# Patient Record
Sex: FEMALE | Race: WHITE | NOT HISPANIC OR LATINO | URBAN - METROPOLITAN AREA
[De-identification: names, ages, dates, MRNs, and addresses within clinical notes are randomized per-mention and may not be internally consistent; named-entity substitution may affect disease eponyms.]

---
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
## 2021-07-20 ENCOUNTER — OFFICE VISIT - RIVER FALLS (OUTPATIENT)
Dept: FAMILY MEDICINE | Facility: CLINIC | Age: 21
End: 2021-07-20

## 2021-07-20 ASSESSMENT — MIFFLIN-ST. JEOR: SCORE: 1478.49

## 2021-07-21 ENCOUNTER — COMMUNICATION - RIVER FALLS (OUTPATIENT)
Dept: FAMILY MEDICINE | Facility: CLINIC | Age: 21
End: 2021-07-21

## 2021-07-21 LAB
CHLAMYDIA TRACHOMATIS RNA, TMA - QUEST: NOT DETECTED
HIV AG/AB  - NOTE: NORMAL
NEISSERIA GONORRHOEAE RNA TMA: NOT DETECTED
RPR: NORMAL

## 2022-02-11 VITALS
WEIGHT: 102.2 LBS | SYSTOLIC BLOOD PRESSURE: 124 MMHG | HEART RATE: 83 BPM | OXYGEN SATURATION: 98 % | BODY MASS INDEX: 16.62 KG/M2 | DIASTOLIC BLOOD PRESSURE: 76 MMHG | TEMPERATURE: 96.6 F

## 2022-02-11 VITALS
TEMPERATURE: 97.7 F | SYSTOLIC BLOOD PRESSURE: 128 MMHG | HEART RATE: 80 BPM | OXYGEN SATURATION: 98 % | WEIGHT: 180.4 LBS | SYSTOLIC BLOOD PRESSURE: 116 MMHG | BODY MASS INDEX: 28.99 KG/M2 | TEMPERATURE: 97.9 F | HEIGHT: 66 IN | BODY MASS INDEX: 29.18 KG/M2 | DIASTOLIC BLOOD PRESSURE: 64 MMHG | DIASTOLIC BLOOD PRESSURE: 68 MMHG | WEIGHT: 179.4 LBS | HEART RATE: 106 BPM

## 2022-02-11 VITALS
WEIGHT: 175 LBS | SYSTOLIC BLOOD PRESSURE: 118 MMHG | HEART RATE: 68 BPM | TEMPERATURE: 98 F | BODY MASS INDEX: 28.46 KG/M2 | DIASTOLIC BLOOD PRESSURE: 74 MMHG

## 2022-02-11 VITALS
HEART RATE: 60 BPM | DIASTOLIC BLOOD PRESSURE: 72 MMHG | TEMPERATURE: 97.8 F | WEIGHT: 177 LBS | SYSTOLIC BLOOD PRESSURE: 124 MMHG | BODY MASS INDEX: 28.45 KG/M2 | HEIGHT: 66 IN | RESPIRATION RATE: 16 BRPM | OXYGEN SATURATION: 97 %

## 2022-02-11 VITALS
HEART RATE: 67 BPM | SYSTOLIC BLOOD PRESSURE: 124 MMHG | HEIGHT: 66 IN | OXYGEN SATURATION: 97 % | WEIGHT: 168.2 LBS | WEIGHT: 172 LBS | HEART RATE: 70 BPM | DIASTOLIC BLOOD PRESSURE: 80 MMHG | HEIGHT: 66 IN | SYSTOLIC BLOOD PRESSURE: 118 MMHG | TEMPERATURE: 97.4 F | BODY MASS INDEX: 27.03 KG/M2 | DIASTOLIC BLOOD PRESSURE: 62 MMHG | TEMPERATURE: 98 F | BODY MASS INDEX: 27.64 KG/M2

## 2022-02-11 VITALS — DIASTOLIC BLOOD PRESSURE: 78 MMHG | HEART RATE: 80 BPM | WEIGHT: 179 LBS | SYSTOLIC BLOOD PRESSURE: 118 MMHG

## 2022-02-11 VITALS
SYSTOLIC BLOOD PRESSURE: 136 MMHG | HEIGHT: 65 IN | BODY MASS INDEX: 25.99 KG/M2 | TEMPERATURE: 98.3 F | DIASTOLIC BLOOD PRESSURE: 82 MMHG | WEIGHT: 156 LBS | HEART RATE: 71 BPM

## 2022-02-11 VITALS
OXYGEN SATURATION: 98 % | BODY MASS INDEX: 29.86 KG/M2 | HEART RATE: 90 BPM | TEMPERATURE: 99.2 F | DIASTOLIC BLOOD PRESSURE: 80 MMHG | WEIGHT: 183.6 LBS | SYSTOLIC BLOOD PRESSURE: 118 MMHG

## 2022-02-11 VITALS
WEIGHT: 175.6 LBS | BODY MASS INDEX: 28.22 KG/M2 | DIASTOLIC BLOOD PRESSURE: 68 MMHG | HEIGHT: 66 IN | HEART RATE: 72 BPM | SYSTOLIC BLOOD PRESSURE: 114 MMHG

## 2022-02-11 VITALS
DIASTOLIC BLOOD PRESSURE: 70 MMHG | SYSTOLIC BLOOD PRESSURE: 117 MMHG | HEART RATE: 68 BPM | HEIGHT: 66 IN | WEIGHT: 147 LBS | TEMPERATURE: 97.6 F | DIASTOLIC BLOOD PRESSURE: 68 MMHG | SYSTOLIC BLOOD PRESSURE: 112 MMHG | WEIGHT: 150 LBS | BODY MASS INDEX: 24.11 KG/M2 | HEART RATE: 70 BPM

## 2022-02-16 NOTE — NURSING NOTE
Comprehensive Intake Entered On:  7/8/2019 5:50 PM CDT    Performed On:  7/8/2019 5:45 PM CDT by Shavonne Cobos LPN               Summary   Chief Complaint :   burning while urination started about two weeks ago. has increased fluid. Is also concern with STD's   Menstrual Status :   Menarcheal   Weight Measured :   172 lb(Converted to: 172 lb 0 oz, 78.02 kg)    Height Measured :   65.75 in(Converted to: 5 ft 6 in, 167.00 cm)    Body Mass Index :   27.97 kg/m2   Body Surface Area :   1.9 m2   Systolic Blood Pressure :   118 mmHg   Diastolic Blood Pressure :   62 mmHg   Mean Arterial Pressure :   81 mmHg   Peripheral Pulse Rate :   70 bpm   BP Site :   Right arm   Pulse Site :   Radial artery   BP Method :   Manual   HR Method :   Manual   Temperature Tympanic :   98.0 DegF(Converted to: 36.7 DegC)    Languages :   English   Shavonne Cobos LPN - 7/8/2019 5:45 PM CDT   Health Status   Allergies Verified? :   Yes   Medication History Verified? :   Yes   Shavonne Cobos LPN - 7/8/2019 5:45 PM CDT   Consents   Consent for Immunization Exchange :   Consent Granted   Consent for Immunizations to Providers :   Consent Granted   Shavonne Cobos LPN - 7/8/2019 5:45 PM CDT   Problems   (As Of: 7/8/2019 5:50:37 PM CDT)   Problems(Active)    Major depressive disorder (SNOMED CT  :6329751403 )  Name of Problem:   Major depressive disorder ; Recorder:   Diana Glasgow; Confirmation:   Confirmed ; Classification:   Medical ; Code:   5580694102 ; Contributor System:   De Correspondent ; Last Updated:   5/26/2017 12:12 PM CDT ; Life Cycle Date:   5/26/2017 ; Life Cycle Status:   Active ; Vocabulary:   SNOMED CT          Meds / Allergies   (As Of: 7/8/2019 5:50:38 PM CDT)   Allergies (Active)   No Known Medication Allergies  Estimated Onset Date:   Unspecified ; Created By:   Tamra Booker CMA; Reaction Status:   Active ; Category:   Drug ; Substance:   No Known Medication Allergies ; Type:   Allergy ; Updated By:   Jhony ZHOU  Tamra; Reviewed Date:   7/8/2019 5:49 PM CDT        Medication List   (As Of: 7/8/2019 5:50:38 PM CDT)   Prescription/Discharge Order    fluconazole  :   fluconazole ; Status:   Processing ; Ordered As Mnemonic:   Diflucan 150 mg oral tablet ; Ordering Provider:   Freddy Acuña MD; Action Display:   Complete ; Catalog Code:   fluconazole ; Order Dt/Tm:   7/8/2019 5:46:07 PM

## 2022-02-16 NOTE — NURSING NOTE
Comprehensive Intake Entered On:  8/14/2019 8:04 AM CDT    Performed On:  8/14/2019 7:55 AM CDT by Debbie Brenner               Summary   Chief Complaint :   c/o  irregular periods- bad cramps and heavy bleeding , migranes x 1 1/2 weeks    Menstrual Status :   Menarcheal   Weight Measured :   168.2 lb(Converted to: 168 lb 3 oz, 76.29 kg)    Height Measured :   65.75 in(Converted to: 5 ft 6 in, 167.00 cm)    Body Mass Index :   27.35 kg/m2   Body Surface Area :   1.88 m2   Systolic Blood Pressure :   124 mmHg   Diastolic Blood Pressure :   80 mmHg   Mean Arterial Pressure :   95 mmHg   Peripheral Pulse Rate :   67 bpm   BP Site :   Right arm   BP Method :   Manual   HR Method :   Electronic   Temperature Tympanic :   97.4 DegF(Converted to: 36.3 DegC)  (LOW)    Oxygen Saturation :   97 %   Languages :   English   Debbie Brenner - 8/14/2019 7:55 AM CDT   Health Status   Allergies Verified? :   Yes   Medication History Verified? :   Yes   Medical History Verified? :   Yes   Pre-Visit Planning Status :   Completed   Tobacco Use? :   Never smoker   Debbie Brenner - 8/14/2019 7:55 AM CDT   Consents   Consent for Immunization Exchange :   Consent Granted   Consent for Immunizations to Providers :   Consent Granted   Debbie Brenner - 8/14/2019 7:55 AM CDT   Meds / Allergies   (As Of: 8/14/2019 8:04:44 AM CDT)   Allergies (Active)   No Known Medication Allergies  Estimated Onset Date:   Unspecified ; Created By:   Tamra Booker CMA; Reaction Status:   Active ; Category:   Drug ; Substance:   No Known Medication Allergies ; Type:   Allergy ; Updated By:   Tamra Booker CMA; Reviewed Date:   8/14/2019 8:02 AM CDT        Medication List   (As Of: 8/14/2019 8:04:44 AM CDT)   No Known Home Medications     Debbie Brenner - 8/14/2019 8:02:15 AM

## 2022-02-16 NOTE — NURSING NOTE
Comprehensive Intake Entered On:  2/5/2020 8:00 AM CST    Performed On:  2/5/2020 8:00 AM CST by Elisa Brooks CMA               Summary   Chief Complaint :   u/s per [facility] for pelvic pain.   Menstrual Status :   Menarcheal   Languages :   English   Elisa Brooks CMA - 2/5/2020 8:00 AM CST   Health Status   Allergies Verified? :   Yes   Medication History Verified? :   Yes   Pre-Visit Planning Status :   Completed   Elisa Brooks CMA - 2/5/2020 8:00 AM CST   Meds / Allergies   (As Of: 2/5/2020 8:00:52 AM CST)   Allergies (Active)   No Known Medication Allergies  Estimated Onset Date:   Unspecified ; Created By:   Tamra Booker CMA; Reaction Status:   Active ; Category:   Drug ; Substance:   No Known Medication Allergies ; Type:   Allergy ; Updated By:   Tamra Booker CMA; Reviewed Date:   8/14/2019 8:02 AM CDT        Medication List   (As Of: 2/5/2020 8:00:52 AM CST)   Prescription/Discharge Order    ethinyl estradiol-etonogestrel  :   ethinyl estradiol-etonogestrel ; Status:   Prescribed ; Ordered As Mnemonic:   NuvaRing 0.120 mg-0.015 mg/24 hours vaginal ring ; Simple Display Line:   1 EA, VAG, q4 wks, as directed--dispense 1 box of 3, 1 box(es), 0 Refill(s) ; Ordering Provider:   Jeanette Conroy; Catalog Code:   ethinyl estradiol-etonogestrel ; Order Dt/Tm:   8/14/2019 11:34:21 AM CDT          FLUoxetine  :   FLUoxetine ; Status:   Prescribed ; Ordered As Mnemonic:   PROzac 20 mg oral capsule ; Simple Display Line:   20 mg, 1 cap(s), Oral, daily, for the first week take 1/2 tab daily, 30 cap(s), 0 Refill(s) ; Ordering Provider:   Geovanna Green MD; Catalog Code:   FLUoxetine ; Order Dt/Tm:   1/20/2020 5:09:35 PM CST

## 2022-02-16 NOTE — LETTER
(Inserted Image. Unable to display)     January 07, 2019      YASSINE ARELLANO  [address], [place] [id number]          Dear YASSINE,      Thank you for selecting [facility] (previously [facility]) for your healthcare needs.      Our records indicate you are due for the following services:     Depo Provera injection due between 01/14 and 01/28/2019.      To schedule an appointment or if you have further questions, please contact your primary clinic:   [facility]       [phone]   [facility]       [phone]              [facility]     [phone]      Powered by [email or web address] and [employer]    Sincerely,    Vivi Aparicio MD

## 2022-02-16 NOTE — LETTER
(Inserted Image. Unable to display)   July 10, 2019        YASSINE ARELLANO      [address] [place] [id number]        Dear YASSINE,    Thank you for selecting [facility] for your health care needs.  Below you will find the results of your recent test(s) done at our clinic.     The tests were normal.      Result Name Current Result Previous Result Reference Range   Chlam/N. gonorrhea Comments  See comment 7/8/2019  See comment 5/23/2019    Chlamydia RNA  NOT DETECTED 7/8/2019  NOT DETECTED 5/23/2019 NOT DETECTED -    Neisseria gonorrhoeae RNA  NOT DETECTED 7/8/2019  NOT DETECTED 5/23/2019 NOT DETECTED -        Please contact me or my assistant at [phone] if you have any questions about your results.    Sincerely,        Doc Samuel MD        What do your labs mean?  Below is a glossary of commonly ordered labs:  LDL   Bad Cholesterol   HDL   Good Cholesterol  AST/ALT   Liver Function   Cr/Creatinine   Kidney Function  Microalbumin   Kidney Function  BUN   Kidney Function  PSA   Prostate    TSH   Thyroid Hormone  HgbA1c   Diabetes Test   Hgb (Hemoglobin)   Red Blood Cells

## 2022-02-16 NOTE — PROGRESS NOTES
Patient:   YASSINE ARELLANO            MRN: [id number]            FIN: [id number]               Age:   19 years     Sex:  Female     :  2000   Associated Diagnoses:   Irregular menstrual bleeding   Author:   Bob Tim MD      Visit Information      Date of Service: 2020 07:59 am  Performing Location: [facility]  Encounter#: [id number]      Primary Care Provider (PCP):  Jeanette Conroy    NPI# [id number]      Chief Complaint   2020 8:00 AM CST     u/s per [facility] for pelvic pain.      Interval History   The patient is an 19-year-old nulligravid female referred by Dr. Geovanna Green for followup of irregular menses.  The patient was a rather poor historian and really did not have much insight into her history of irregular periods and pelvic pain for which she was referred.  After multiple questions the patient thinks that she probably had irregular periods when she started having menses and was given Depo-Provera to control bleeding for a couple of years.  She was told to go off Depo after a few years and did not use anything for birth control and was sexually active.  On Depo she had some irregular periods again and then when stopping Depo she began having a bit more irregularity and prolonged bleeding episodes.  She saw Dr. Green about a month ago she states and was screened for STDs.  She was started on the NuvaRing and referred for ultrasound with me.  Since being on the NuvaRing the patient believes that her periods have been fairly well-controlled and she is no longer experiencing any discomfort.  She has probably had sex once in the last month and she says she does not recall having pain or not.  She has not had any bleeding recently.        Review of Systems   Review  of systems is negative except as documented under interval history.      Health Status   Allergies:    Allergic Reactions (Selected)  No Known Medication Allergies   Medications:  (Selected)    Prescriptions  Prescribed  NuvaRing 0.120 mg-0.015 mg/24 hours vaginal rin EA, VAG, q4 wks, Instructions: as directed--dispense 1 box of 3, # 1 box(es), 0 Refill(s), Type: Maintenance, Pharmacy: [id number], 1 EA VAG q4 wks,Instr:as directed--dispense 1 box of 3  PROzac 20 mg oral capsule: = 1 cap(s) ( 20 mg ), Oral, daily, Instructions: for the first week take 1/2 tab daily, # 30 cap(s), 0 Refill(s), Type: Maintenance, Pharmacy: [facility] #[id number], 1 cap(s) Oral daily,Instr:for the first week take 1/2 tab daily   Problem list:    All Problems  Major depressive disorder / SNOMED CT 6839760218 / Confirmed  Resolved: Inpatient stay / SNOMED CT 186900363      Histories   Past Medical History:    Active  Major depressive disorder (6076035460)  Resolved  Inpatient stay (325475513): Onset on 2017 at 17 years.  Resolved on 2017 at 17 years.  Comments:  2017 CDT 10:16 AM CDT - Vy Bethea  @[facility], MMD, moderate   Family History:    Alcohol abuse  Father (Lennox Sin)  Grandparent     Procedure history:    Closed reduction of dislocation (88446404) on 2009 at 8 Years.  Comments:  2013 1:00 PM CDT - Chante Davidson  Left radius fracture   Social History:        Alcohol Assessment            Never      Home and Environment Assessment            Lives with Mother, Stepfather, 1 younger brother and 1 younger sister..        Physical Examination   Gynecologic:  Vaginal probe ultrasound was performed..       Review / Management   Radiology results   Ultrasound, Vaginal probe pelvic ultrasound was performed.  The uterus is anteverted and normal in size.  It measures 6 x 2.5 x 3 cm.  Endometrium is really proliferative in appearance.  The myometrium is normal with no abnormalities seen.  There is no tenderness with movement of the uterus using the vaginal probe.  The ovaries are small and normal bilaterally with no fluid in the cul-de-sac and no pelvic tenderness  on examination.      Impression and Plan   Diagnosis     Irregular menstrual bleeding ([id number] N92.6).     Apparently anovulatory bleeding when not on menstrual suppression now pain and bleeding controlled with NuvaRing..     Plan:  The patient was advised that she probably should plan to use the pill or NuvaRing to control bleeding and also for birth control as well.  She seemed comfortable with this and will return as needed..    Patient Instructions:       Counseled: Patient, Regarding diagnosis, Regarding treatment, Regarding medications, Verbalized understanding.

## 2022-02-16 NOTE — PROGRESS NOTES
Chief Complaint    pt c/o cough with yellow/green phlegm, headache, runny nose, chest congestion, fever, for the past couple of days, right ear pain  History of Present Illness      Has had symptoms for about 4-5 days. Had sore throat and now resolved. Began with a cough. Had sinus pressure which is better and runny nose with post nasal drip. Feeling a little better today. Missed school today.  Review of Systems      No fevers       No leg swelling       No rashes  Physical Exam   Vitals & Measurements    T: 99.2(Tympanic)  HR: 104(Peripheral)  BP: 118/80  SpO2: 98%     WT: 183.6 lb       General: No acute distress. Sniffling during exam      HENT: Tympanic membranes are clear, No pharyngeal erythema.      Neck: No lymphadenopathy.      Respiratory: Lungs are clear to auscultation.      Cardiovascular: Normal rate, Regular rhythm.      Musculoskeletal: Normal gait.  Assessment/Plan   Likely viral syndrome: Discussed symptomatic treatment and will hold off on antibiotics at this time.  Follow-up if not improving.  Patient Information     Name:YASSINE ARELLANO      Address:      [address] [address]     Sex:Female     YOB: 2000     [phone]     Emergency Contact:BERNIE POLLARD     MRN:[id number]     FIN:[id number]     Location:[facility]     Date of Service:01/29/2018      Primary Care Physician:       Jeanine Carreon, [phone]  Procedure/Surgical History     Closed reduction of dislocation (01/29/2009)  Medications        Depo-Provera Contraceptive 150 mg/mL intramuscular suspension: 150 mg, im, once, Order expires 1.30.2018, 1 mL, 0 Refill(s).                Allergies    No known allergies

## 2022-02-16 NOTE — PROGRESS NOTES
Chief Complaint    c/o left great toenail is ingrown--toe swollen, redness. denies drainage.  History of Present Illness      Patient is here for an ingrown toenail for the past couple of weeks.  She has been applying alcohol to her toenail.  Area is red, swollen, tender.  Review of Systems           See HPI.  All other review of systems negative.              Physical Exam   Vitals & Measurements    T: 97.9   F (Tympanic)  HR: 80(Peripheral)  BP: 116/64     HT: 65.75 in  WT: 180.4 lb  BMI: 29.34           General:  Alert and oriented, No acute distress.            Eye:  Pupils are equal, round and reactive to light, Normal conjunctiva.              Musculoskeletal:  right great toenail swollen, red, tender.          Integumentary:  Warm, No rash.            Psychiatric:  Cooperative, Appropriate mood & affect, Normal judgment.             Assessment/Plan       1. Ingrown left big toenail (L60.0)         Informed consent was obtained.  A tourniquet was placed on the toes and digital block done with 3cc of 1% lidocaine.  The lateral 5mm of the nail were elevated with the blunt edge of a scissors and cut down to the matrix.  The piece of nail was removed with a straight hemostat.  The tourniquet was removed and hemostasis was achieved with pressure.  A pressure dressing was applied.  The procedure was tolerated well.                        IChante MA, acted solely as a scribe for, and in the presence of Dr. Juan Antonio Samuel who performed the services.             IJuan Antonio MD, personally performed the services described in this documentation.  The documentation was scribed in my presence and is both accurate and complete.                Patient Information     Name:YASSINE ARELLANO      Address:      [address] [place] [id number]     Sex:Female     YOB: 2000     [phone]     Emergency Contact:BERNIE POLLARD     MRN:[id number]     FIN:[id number]      Location:[facility]     Date of Service:09/24/2018      Primary Care Physician:       Jeanine Carreon, [phone]      Attending Physician:       Juan Antonio Samuel MD, [phone]  Problem List/Past Medical History    Ongoing     Major depressive disorder    Historical     Inpatient stay       Comments: @[place] - JAM, MMD, moderate  Procedure/Surgical History     Closed reduction of dislocation (01/29/2009)            Comments:      Left radius fracture  Medications     No Recorded Medications      Allergies    No known allergies  Social History    Smoking Status - 09/24/2018     Never smoker     Home and Environment      Lives with Mother, Stepfather, 1 younger brother and 1 younger sister.., 12/14/2016     Tobacco - Denies Tobacco Use, 05/26/2017  Family History    Alcohol abuse: Father and Grandparent.  Immunizations      Vaccine Date Status Comments      influenza (LAIV) 10/15/2015 Given      influenza (LAIV) 10/20/2014 Given      human papillomavirus vaccine 03/12/2014 Given [3/12/2014] Back      Hep A, pediatric/adolescent 03/12/2014 Given [3/12/2014] Front      human papillomavirus vaccine 11/06/2013 Given      human papillomavirus vaccine 08/16/2013 Given      Hep A, pediatric/adolescent 08/16/2013 Given [8/16/2013] L upper      meningococcal conjugate vaccine 08/16/2013 Given [8/16/2013] L lower      varicella 08/02/2012 Given      tetanus/diphth/pertuss (Tdap) adult/adol 08/02/2012 Given      influenza, H1N1, inactivated 12/15/2009 Recorded      MMR (measles/mumps/rubella) 03/31/2006 Recorded      OPV 03/31/2006 Recorded      DTaP 03/31/2006 Recorded      MMR (measles/mumps/rubella) 09/24/2002 Recorded      DTaP 09/24/2002 Recorded      Hib (PRP-T) 04/22/2002 Recorded      Hep B 04/22/2002 Recorded      varicella 04/22/2002 Recorded      pneumococcal (PCV7) 2001 Recorded      OPV 2001 Recorded      DTaP 2001 Recorded      Hib (PRP-T) 2001 Recorded       Hep B 2001 Recorded      pneumococcal (PCV7) 2001 Recorded      OPV 2001 Recorded      DTaP 2001 Recorded      Hib (PRP-T) 2001 Recorded      Hep B 2001 Recorded      pneumococcal (PCV7) 2001 Recorded      OPV 2001 Recorded      DTaP 2001 Recorded      pneumococcal (PCV7) 2001 Recorded      rotavirus vaccine - Not Given      rotavirus vaccine - Not Given      rotavirus vaccine - Not Given      IPV - Not Given      IPV - Not Given      IPV - Not Given      IPV - Not Given      Hib (HbOC) - Not Given

## 2022-02-16 NOTE — PROGRESS NOTES
Patient:   YASSINE SIN            MRN: [id number]            FIN: [id number]               Age:   21 years     Sex:  Female     :  2000   Associated Diagnoses:   Vagina itching; Screen for STD (sexually transmitted disease)   Author:   Sid Smith MD      Visit Information      Date of Service: 2021 03:55 pm  Performing Location: [facility]  Encounter#: [id number]      Primary Care Provider (PCP):  Jeanette Conroy    NPI# [id number]      Referring Provider:  Sid Smith MD    NPI# [id number]      Chief Complaint   2021 4:03 PM CDT    vaginal yeast infection, OTC medication has improved, continues to have discharge.      History of Present Illness   Patient is here because of vaginal discharge she treated herself with the thought was a yeast infection a few weeks ago she is Monistat.  She still has some scant clearish discharge and has had prior vaginosis wants to be checked out.  Also wants STD screening she had a boyfriend for several years and they recently split up. Denies being pregnant            Review of Systems   Constitutional:  Negative except as documented in history of present illness.    Genitourinary:  Negative.    Gynecologic:  Negative except as documented in history of present illness.       Health Status   Allergies:    Allergic Reactions (Selected)  No Known Medication Allergies   Problem list:    All Problems  Major depressive disorder / SNOMED CT 5083263859 / Confirmed      Histories   Past Medical History:    Active  Major depressive disorder (6478470479)  Resolved  Inpatient stay (131155671): Onset on 2017 at 17 years.  Resolved on 2017 at 17 years.  Comments:  2017 CDT 10:16 AM CDT - Vy Bethea  @[place] - [facility], MMD, moderate   Family History:    Alcohol abuse  Father (Lennox Sin)  Grandparent     Procedure history:    Closed reduction of dislocation (SNOMED CT 13186577) performed by Wili Bentley MD on  1/29/2009 at 8 Years.  Comments:  8/7/2013 1:00 PM CDT - Chante Davidson  Left radius fracture   Social History:        Electronic Cigarette/Vaping Assessment            Electronic Cigarette Use: Never.      Alcohol Assessment            Never      Tobacco Assessment            Never (less than 100 in lifetime)      Home and Environment Assessment            Lives with Mother, Stepfather, 1 younger brother and 1 younger sister..        Physical Examination   Measurements from flowsheet : Measurements   7/20/2021 4:03 PM CDT Height Measured - Standard 65 in    Height/Length Estimated 65.75 in    Weight Measured - Standard 156 lb    BSA 1.8 m2    Body Mass Index 25.96 kg/m2  HI      General:  Alert and oriented, No acute distress.    Genitourinary:  She had normal external genitalia no significant discharge noted swab taken for wet prep.  No external lesions noted  .    Neurologic:  Alert, Oriented.       Impression and Plan   Diagnosis     Vagina itching (XTC78-KS N89.8).     Screen for STD (sexually transmitted disease) (MIV18-ZK Z11.3).     Plan:  Patient with what is presumed to be recent yeast infection is symptoms pretty much cleared but did not completely go away wants to be screened for STDs which we will do today.  Follow-up if things are back to normal over the next month  .

## 2022-02-16 NOTE — NURSING NOTE
Received call from patient's mom, Isabel at 1412 stating that she made an appt for patient to get her next Depo Provera injection, but was told she needs a new order.      Called Isabel back and TATIANNA at 1435- last time order was initiated was 1/2017.  Advised that order is good for 1 year, and that patient is due for a follow up/check up with [facility] at this time.  Advised to call back to schedule appt, or if she has any further questions.

## 2022-02-16 NOTE — LETTER
(Inserted Image. Unable to display)     November 15, 2019      YASSINE ARELLANO  [address]   [place], [place] [id number]          Dear YASSINE,      Thank you for selecting [facility] ([facility]viously [facility]) for your healthcare needs.      Our records indicate you are due for the following services:     Follow-up office visit.      To schedule an appointment or if you have further questions, please contact your primary clinic:   [facility]       [phone]   [facility]       [phone]              [facility]     [phone]      Powered by [employer]    Sincerely,    DARYL CedenoNP

## 2022-02-16 NOTE — PROGRESS NOTES
Chief Complaint    Verbal consent from mom to see today, Physical and depo shot.  History of Present Illness      Pt here today for annual exam      She denies possibility of pregnancy.  Is aware of various alternatives for contraception including nexplanon, OCP, nuvaring, patch, Depo Provera, IUD and IUS, NFP, diaphragm, condoms, abstinence and sterilization. She is aware of risks associated with estrogen including stroke, DVT, PE, CVA, MI.  Also counseled patient that all patients of reproductive age should be taking 400 mcg folic acid daily to reduce risks of 2 birth defects.              She has used depo in the past and would like to resume this, feels it has been well tolerated.  Was living on her own for a while but now back at home, her mom and she have a difficult relationship.   she is allowed to leave school early to work daily starting at 10:30, saving up money to afford her own place.  plans to go to [employer] next year.  has some insomnia but says mood is doing ok, does not want to start any meds, so is handling the insomnia and declines referral for CBT for insomnia.  says she did sleep better when she lived on her own.         Review of systems is negative except as per HPI including no fevers, chills, sore throat, runny nose, nausea, vomiting, constipation, diarrhea, rash or new skin lesions, chest pain, palpitations, slurred speech, new paresthesia, shortness of breath or wheeze.             Exam:      see vitals listed below      General: alert and oriented ×3 no acute distress.      HEENT: Normocephalic and atraumatic.       Eyes pupils are equal round and reactive to light extraocular motion is intact. normal conjunctiva      Hearing is grossly normal and there is no otorrhea. Tympanic membranes are pearly grey with a normal light reflex.      Nares are patent there is no rhinorrhea.       Mucous membranes are moist and pink.      Chest: has bilateral rise with no increased work of breathing. clear  to auscultation without wheezes, rhonchi, or rales.      Cardiovascular: normal perfusion and brisk capillary refill. S1S2 with regular rate and rhythm and no murmurs, gallops or rubs.      Musculoskeletal: no gross focal abnormalities and normal gait.      Neuro: no gross focal abnormalities and memory seems intact.  CN 2-12 are grossly intact.      Psychiatric: speech is clear and coherent and fluent. Patient dressed appropriately for the weather. Mood is appropriate and affect is full.      Abd: no rebound or guarding, normal BS      Skin: no rash or lesionsidentified      Discussed:      using sunscreen, protecting from sunburn,      taking folic acid 400 mcg daily      refer to Overinteractive Mediachooseplate.gov, AHA and ADA for diet and exercise recommendations      consume [id number] mg calcium daily      std screening      regular self skin checks      get 150min /week of aerobic exercise  Physical Exam   Vitals & Measurements    T: 97.7   F (Tympanic)  HR: 106(Peripheral)  BP: 128/68  SpO2: 98%     HT: 65.75 in  WT: 179.4 lb   Assessment/Plan       Contraception (Z30.9), Contraception management (Z30.9)         Orders:          medroxyPROGESTERone, 150 mg, im, once, (Completed)          63121 therapeutic prophylactic/dx injection subq/im (Charge), Quantity: 1, Contraception management           medroxyprogesterone acetate inj, 1 mg (Charge), Quantity: 150, Contraception management          Miscellaneous Order (Request), depo provera 150 mg IM q 13 weeks witin 2 week dosing window          POC HCG, URINE* ([facility]), Specimen Type: Urine, Collection Date: 10/29/18 10:02:00 CDT          Return to Clinic (Request), RFV: Depo Shot, Return in 3 months          Return to Clinic (Request), RFV: needs lab only urine HCG, Return in 2 weeks                Immunization due (Z23)                Left shoulder pain (M25.512)         Orders:          Occupational Therapy Evaluation and Treatment (Request), Left shoulder pain           Physical Therapy Evaluation and Treatment (Request), Low back pain  Left shoulder pain                Low back pain (M54.5)         Orders:          Physical Therapy Evaluation and Treatment (Request), Low back pain  Left shoulder pain                Screen for STD (sexually transmitted disease) (Z11.3)         Orders:          Chlamydia/Neisseria gonorrhoeae RNA, TMA* ([facility]), Specimen Type: Urine, Collection Date: 10/29/18 10:39:00 CDT               well child exam, spoke with mom and got permission to treat patient and resume depo  Patient Information     Name:YASSINE ARELLANO      Address:      [address] [place] [id number]     Sex:Female     YOB: 2000     [phone]     Emergency Contact:BERNIE POLLARD     MRN:[id number]     FIN:[id number]     Location:[facility]     Date of Service:10/29/2018      Primary Care Physician:       Jeanine Carreon, [phone]      Attending Physician:       Vivi Aparicio MD, [phone]  Problem List/Past Medical History    Ongoing     Major depressive disorder    Historical     Inpatient stay       Comments: @[place] - JAM, MMD, moderate  Procedure/Surgical History     Closed reduction of dislocation (01/29/2009)            Comments:      Left radius fracture  Medications     No Recorded Medications      Allergies    No known allergies  Social History    Smoking Status - 10/29/2018     Never smoker     Home and Environment      Lives with Mother, Stepfather, 1 younger brother and 1 younger sister.., 12/14/2016     Tobacco - Denies Tobacco Use, 05/26/2017  Family History    Alcohol abuse: Father and Grandparent.  Immunizations      Vaccine Date Status Comments      influenza (LAIV) 10/15/2015 Given      influenza (LAIV) 10/20/2014 Given      human papillomavirus vaccine 03/12/2014 Given [3/12/2014] Back      Hep A, pediatric/adolescent 03/12/2014 Given [3/12/2014] Front      human papillomavirus  vaccine 11/06/2013 Given      human papillomavirus vaccine 08/16/2013 Given      Hep A, pediatric/adolescent 08/16/2013 Given [8/16/2013] L upper      meningococcal conjugate vaccine 08/16/2013 Given [8/16/2013] L lower      varicella 08/02/2012 Given      tetanus/diphth/pertuss (Tdap) adult/adol 08/02/2012 Given      influenza, H1N1, inactivated 12/15/2009 Recorded      MMR (measles/mumps/rubella) 03/31/2006 Recorded      OPV 03/31/2006 Recorded      DTaP 03/31/2006 Recorded      MMR (measles/mumps/rubella) 09/24/2002 Recorded      DTaP 09/24/2002 Recorded      Hib (PRP-T) 04/22/2002 Recorded      Hep B 04/22/2002 Recorded      varicella 04/22/2002 Recorded      pneumococcal (PCV7) 2001 Recorded      OPV 2001 Recorded      DTaP 2001 Recorded      Hib (PRP-T) 2001 Recorded      Hep B 2001 Recorded      pneumococcal (PCV7) 2001 Recorded      OPV 2001 Recorded      DTaP 2001 Recorded      Hib (PRP-T) 2001 Recorded      Hep B 2001 Recorded      pneumococcal (PCV7) 2001 Recorded      OPV 2001 Recorded      DTaP 2001 Recorded      pneumococcal (PCV7) 2001 Recorded      rotavirus vaccine - Not Given      rotavirus vaccine - Not Given      rotavirus vaccine - Not Given      IPV - Not Given      IPV - Not Given      IPV - Not Given      IPV - Not Given      Hib (HbOC) - Not Given  Lab Results       Lab Results (Last 4 results within 90 days)        U HCG POC: NEGATIVE (10/29/18 11:07:00 CDT)

## 2022-02-16 NOTE — TELEPHONE ENCOUNTER
---------------------  From: Tere Angeles CMA   To: Appointment Pool ([id number]_[place] - [place]);     Sent: 1/20/2020 1:53:04 PM CST  Subject: F/u appt     Patient was transferred to the  and [facility] requesting to make a follow up appt with [facility] for contraception. Please call [phone]  okLM on  to call us back and speak to JOANNE before making appointment.

## 2022-02-16 NOTE — PROGRESS NOTES
Patient:   YASSINE ARELLANO            MRN: [id number]            FIN: [id number]               Age:   16 years     Sex:  Female     :  2000   Associated Diagnoses:   Contraception   Author:   Jeanine Carreon      Health Status   Allergies:    Allergic Reactions (Selected)  No known allergies   Medications:  (Selected)   Prescriptions  Prescribed  PROzac 20 mg oral capsule: 2 cap(s) ( 40 mg ), po, daily, # 60 cap(s), 5 Refill(s), Type: Maintenance, Pharmacy: [facility] [id number], 2 cap(s) po daily  Documented Medications  Documented  Depo-Provera: ( 400 mg ), im, 0 Refill(s), Type: Maintenance     PPC with mother, she normally gets depo injection through previous clinic but would like to use our clinic instead,  she has brought paperwork from [facility] in [place]   She is due for her next depo shot at this time and has been compliant with her previous injections         Objective   General:  Alert and oriented, No acute distress.    Eye:  Pupils are equal, round and reactive to light.    HENT:  Normocephalic.    Musculoskeletal:  Normal range of motion.    Integumentary:  Warm, Dry, Pink.    Neurologic:  Alert, Oriented, Normal sensory.    Psychiatric:  Cooperative, Appropriate mood & affect, Normal judgment.       Impression and Plan   Assessment and Plan:          Diagnosis: Contraception (KKY32-ZK Z30.9), Contraception (FHI47-IO Z30.9).         Course: is not late for depo   can have injection through our clinic  prescription will stand for a yr, does not need pap smear, will need regular well child checks.

## 2022-02-16 NOTE — TELEPHONE ENCOUNTER
---------------------  From: Shavonne Kaur CMA (Phone Messages Pool ([id number]_[facility]))   To: Da Gibson MD;     Sent: 10/7/2020 5:33:17 PM CDT  Subject: General Message     Time: 5:30 pm  Note: Received patients RST, results were negative.

## 2022-02-16 NOTE — PROGRESS NOTES
Chief Complaint    c/o itching and spotting between periods  History of Present Illness      Patient would like to be screened for STDs.  She had a relationship that ended and he had chlamydia and gonorrhea.  She has been tested and was negative and she has not had contact with him or any other sexual contact since then which is wants to be retested.  She does note she had a little bit of irritation in the vagina no real discharge no bleeding no fever or chills  Review of Systems      Negative except for HPI  Physical Exam   Vitals & Measurements    HR: 72(Peripheral)  BP: 114/68     HT: 65.75 in  WT: 175.6 lb  BMI: 28.56       Alert and oriented no distress no skin rashes  Assessment/Plan       Screen for STD (sexually transmitted disease) (Z11.3)         Discussed causes for vaginitis besides STDs we will do a screening for chlamydia gonorrhea with urine she is going to try treatment for yeast and will return if she has any further symptoms       Orders:         fluconazole, = 1 tab(s) ( 150 mg ), PO, Once, # 2 tab(s), 2 Refill(s), Type: Soft Stop, Pharmacy: [facility] [id number], 1 tab(s) Oral once, (Ordered)         Chlamydia/Neisseria gonorrhoeae RNA, TMA* ([facility]), Specimen Type: Urine, Collection Date: 05/23/19 9:43:00 CDT  Patient Information     Name:YASSINE ARELLANO      Address:      [address] [address]     Sex:Female     YOB: 2000     [phone]     Emergency Contact:BERNIE POLLARD     MRN:[id number]     FIN:[id number]     Location[facility]     Date of Service:05/23/2019      Primary Care Physician:       NONE ,       Attending Physician:       Freddy Acuña MD, [phone]  Problem List/Past Medical History    Ongoing     Major depressive disorder    Historical     Inpatient stay       Comments: @[place] - JAM, MMD, moderate  Procedure/Surgical History     Closed reduction of dislocation (01/29/2009)      Comments:  Left radius fracture.  Medications        Diflucan 150 mg oral tablet: 150 mg, 1 tab(s), PO, Once, 2 tab(s), 2 Refill(s).         Allergies    No Known Medication Allergies  Social History    Smoking Status - 05/23/2019     Never smoker     Alcohol      Never, 11/07/2018     Home and Environment      Lives with Mother, Stepfather, 1 younger brother and 1 younger sister.., 12/14/2016  Family History    Alcohol abuse: Father and Grandparent.  Immunizations      Vaccine Date Status Comments      influenza (LAIV) 10/15/2015 Given      influenza (LAIV) 10/20/2014 Given      human papillomavirus vaccine 03/12/2014 Given [3/12/2014] Back      Hep A, pediatric/adolescent 03/12/2014 Given [3/12/2014] Front      human papillomavirus vaccine 11/06/2013 Given      human papillomavirus vaccine 08/16/2013 Given      Hep A, pediatric/adolescent 08/16/2013 Given [8/16/2013] L upper      meningococcal conjugate vaccine 08/16/2013 Given [8/16/2013] L lower      varicella 08/02/2012 Given      tetanus/diphth/pertuss (Tdap) adult/adol 08/02/2012 Given      influenza, H1N1, inactivated 12/15/2009 Recorded      OPV 03/31/2006 Recorded      DTaP 03/31/2006 Recorded      MMR (measles/mumps/rubella) 03/31/2006 Recorded      DTaP 09/24/2002 Recorded      MMR (measles/mumps/rubella) 09/24/2002 Recorded      varicella 04/22/2002 Recorded      Hep B 04/22/2002 Recorded      Hib (PRP-T) 04/22/2002 Recorded      OPV 2001 Recorded      pneumococcal (PCV7) 2001 Recorded      DTaP 2001 Recorded      OPV 2001 Recorded      pneumococcal (PCV7) 2001 Recorded      DTaP 2001 Recorded      Hep B 2001 Recorded      Hib (PRP-T) 2001 Recorded      pneumococcal (PCV7) 2001 Recorded      OPV 2001 Recorded      DTaP 2001 Recorded      Hep B 2001 Recorded      Hib (PRP-T) 2001 Recorded      pneumococcal (PCV7) 2001 Recorded      rotavirus vaccine - Not Given      rotavirus  vaccine - Not Given      rotavirus vaccine - Not Given      IPV - Not Given      IPV - Not Given      IPV - Not Given      IPV - Not Given      Hib (HbOC) - Not Given  Lab Results          Lab Results (Last 4 results within 90 days)           Chlam/N. gonorrhea Comments: See comment (05/23/19 09:58:00)          Chlamydia RNA: NOT DETECTED (05/23/19 09:58:00)          Neisseria gonorrhoeae RNA: NOT DETECTED (05/23/19 09:58:00)

## 2022-02-16 NOTE — PROGRESS NOTES
Patient:   YASSINE ARELLANO            MRN: [id number]            FIN: [id number]               Age:   20 years     Sex:  Female     :  2000   Associated Diagnoses:   Viral URI   Author:   Jeanette Conroy      Visit Information      Date of Service: 2020 07:07 am  Performing Location: [facility]  Encounter#: [id number]      Primary Care Provider (PCP):  Jeanette Conroy    NPI# [id number]      Referring Provider:  Jeanette Conroy    NPI# [id number]   Visit type:  video.    Participants in room during visit:  _pt   Location of patient:  _home  Location of provider:  _ home  Video Start Time:  802  Video End Time:   _820    Today's visit was conducted via video conference due to the COVID-19 pandemic.  The patient's consent to proceed with a video visit has been obtained and documented.      Chief Complaint   2020 7:57 AM CDT     c/o fever, body aches, and swollen lymph nodes x5 days. Reports having a NEG covid test 4 days ago. Verbal consent given for VIDEO visit- [phone]        History of Present Illness   Patient is a _ 20 year old _female who is being evaluated via a billable video visit.  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 Not much of a cough. Able to swollow/eat/drink. No diarrhea.  Treated with ibuprofen and tylenol  On  she was tested at a clinic for COVID19 and returned test yesterday was negative  she has had no known exposure to COVID19 and wears a mask at work  she denies and soreness in the throat today, continues to have good po intake , temp is down to 99.5 but she has some swollen nodes on the back of her neck, maybe pea sized, 3-4 nodes. Still a little aches  Denies any bug bites at nape of neck or elsewhere  she has never had mononucleosis      Health Status   Allergies:    Allergic Reactions (Selected)  No Known Medication Allergies   Medications:  (Selected)   ,    Medications          No  Known Home Medications     Problem list:    All Problems  Major depressive disorder / SNOMED CT 3733148077 / Confirmed      Histories   Past Medical History:    Active  Major depressive disorder ([id number])  Resolved  Inpatient stay ([id number]): Onset on 5/19/2017 at 17 years.  Resolved on 5/24/2017 at 17 years.  Comments:  6/14/2017 CDT 10:16 AM CDT - Vy Bethea  @[facility], MMD, moderate   Family History:    Alcohol abuse  Father (Lennox Sin)  Grandparent     Procedure history:    Closed reduction of dislocation (SNOMED CT 94858985) performed by Wili Bentley MD on 1/29/2009 at 8 Years.  Comments:  8/7/2013 1:00 PM CDT - Chante Davidson  Left radius fracture   Social History:        Alcohol Assessment            Never      Home and Environment Assessment            Lives with Mother, Stepfather, 1 younger brother and 1 younger sister..        Physical Examination   General:  Alert and oriented, No acute distress.    Eye:  Normal conjunctiva.    Neck:  she points to the posterior cervical lymph node chain on the left side where she can feel the swollen nodes. PUts camera on her neck, there is no redness or evidence of bite or skin infection. SHe has no nuccal rigidity, good ROM neck.  She puts camera to posterior oropharynx and tonsil pillars appear small, no exudate visible.    Respiratory:  Respirations are non-labored.    Psychiatric:  Cooperative, Appropriate mood & affect, Normal judgment.       Impression and Plan   Diagnosis     Viral URI (JKT27-SA J06.9).     Plan:  doubt false negative COVID19 test. Although she was tested fairly early in symptom-phase. More likely mononucleosis. She would likely be too early for monospot tests to be accurate and cost is a concern for her and would not alter treatment. Doubt strep throat.  Doubt reaction to bug bites or Lyme    She intends to stay home from work today but has missed nearly a week now with no compensatory pay and cost is big concern. She  would like to return to work 7/12 nights as long as fever free for 3 days prior and looks like she is near fever free today    will write note for her employer and she will   educated on mono expectations and management  educated that swollen lymph nodes could be indicator more concerning disease process and expect resolution in 2-3 weeks or needs eval. She agrees to plan.    Patient Instructions:       Counseled: Patient.

## 2022-02-16 NOTE — PROGRESS NOTES
Chief Complaint    Pt here for sore throat,productive cough and right ear pain x week.  History of Present Illness      Symptoms started a week ago with a cold. Had a runny nose and cough with a sore throat. Feels like ear is underwater and right ear hurts when coughs and opens mouth.  Review of Systems      No fevers or vomiting. Denies rashes  Physical Exam   Vitals & Measurements    T: 97.8(Tympanic)  HR: 60(Peripheral)  RR: 16  BP: 124/72  SpO2: 97%     HT: 65.75 in  WT: 177 lb  BMI: 28.78       General: No acute distress.      HENT: Right tympanic membrane is opaque and retracted.  Left tympanic membrane serous otitis.  No pharyngeal erythema.      Neck: No lymphadenopathy.      Respiratory: Lungs are clear to auscultation.      Cardiovascular: Normal rate, Regular rhythm.      Musculoskeletal: Normal gait.  Assessment/Plan       Right otitis media        Will treat with amoxicillin.  Follow-up if not improving.  Patient Information     Name:YASSINE ARELLANO      Address:      [address] [address]     Sex:Female     YOB: 2000     Phone:[phone]     MRN:[id number]     FIN:[id number]     Location:[facility]     Date of Service:10/23/2017      Primary Care Physician:       Jeanine Carreon, [phone]  Medications    Depo-Provera Contraceptive 150 mg/mL intramuscular suspension, 150 mg, im, once  Allergies    No known allergies  Family History    Alcohol abuse: Father and Grandparent.

## 2022-02-16 NOTE — PROGRESS NOTES
Chief Complaint    burning while urination started about two weeks ago. has increased fluid. Is also concern with STD's  History of Present Illness      Patient here for urine urgency and pain. Symptoms have been going on for the past few weeks. Does have some worry about have an STD's. Does have irregular bleeding and last day was about a week ago. No new soaps or feminine products.  Review of Systems          ROS reviewed an negative except for symptoms noted in HPI.                   Physical Exam   Vitals & Measurements    T: 98.0   F (Tympanic)  HR: 70(Peripheral)  BP: 118/62     HT: 65.75 in  WT: 172 lb  BMI: 27.97             General:  Alert and oriented, No acute distress.             Eye:  Pupils are equal, round and reactive to light, Normal conjunctiva.             HENT:  Oral mucosa is moist.             Neck:  Supple.             Respiratory:  Respirations are non-labored.             Cardiovascular:  Normal rate, Regular rhythm, No edema.             Gastrointestinal:  Soft, non tender, Non-distended.             Genitourinary:  No flank pain             Musculoskeletal:  Normal gait.             Integumentary:  Warm, No rash.             Psychiatric:  Cooperative, Appropriate mood & affect, Normal judgment.                  UA positive for infection      GC/chlamydia pending  Assessment/Plan       Dysuria (R30.0)          Will notify patient when remainder of results are available.        treat with trimeth/sulfa         Ordered:          sulfamethoxazole-trimethoprim, 1 tab(s), Oral, bid, x 3 day(s), # 6 tab(s), 1 Refill(s), Type: Acute, Pharmacy: [id number], 1 tab(s) Oral bid,x3 day(s), (Ordered)                Orders:         Chlamydia/Neisseria gonorrhoeae RNA, TMA* ([facility]), Specimen Type: Urine, Collection Date: 07/08/19 18:06:00 CDT         POC URINALYSIS, UA* ([facility]), Specimen Type: Urine, Collection Date: 07/08/19 18:06:00 CDT      Shavonne SANDERS LPN, acted solely as a  scribe for, and in the presence of Dr. Juan Antonio Samuel who performed the services.  Patient Information     Name:YASSINE ARELLANO      Address:      [address] [place] [address] [address] [place] [address]     Sex:Female     YOB: 2000     [phone]     Emergency Contact:BERNIE POLLARD     MRN:[id number]     FIN:[id number]     Location:[facility]     Date of Service:07/08/2019      Primary Care Physician:       NONE ,       Attending Physician:       Juan Antonio Samuel MD, [phone]  Problem List/Past Medical History    Ongoing     Major depressive disorder    Historical     Inpatient stay       Comments: @[place] - JAM, MMD, moderate  Procedure/Surgical History     Closed reduction of dislocation (01/29/2009)            Comments: Left radius fracture.  Medications     No medications documented  Allergies    No Known Medication Allergies  Social History    Smoking Status - 05/23/2019     Never smoker     Alcohol      Never, 11/07/2018     Home/Environment      Lives with Mother, Stepfather, 1 younger brother and 1 younger sister.., 12/14/2016  Family History    Alcohol abuse: Father and Grandparent.  Immunizations      Vaccine Date Status Comments      influenza (LAIV) 10/15/2015 Given      influenza (LAIV) 10/20/2014 Given      human papillomavirus vaccine 03/12/2014 Given [3/12/2014] Back      Hep A, pediatric/adolescent 03/12/2014 Given [3/12/2014] Front      human papillomavirus vaccine 11/06/2013 Given      human papillomavirus vaccine 08/16/2013 Given      Hep A, pediatric/adolescent 08/16/2013 Given [8/16/2013] L upper      meningococcal conjugate vaccine 08/16/2013 Given [8/16/2013] L lower      varicella 08/02/2012 Given      tetanus/diphth/pertuss (Tdap) adult/adol 08/02/2012 Given      influenza, H1N1, inactivated 12/15/2009 Recorded      OPV 03/31/2006 Recorded      DTaP 03/31/2006 Recorded      MMR (measles/mumps/rubella) 03/31/2006 Recorded       DTaP 09/24/2002 Recorded      MMR (measles/mumps/rubella) 09/24/2002 Recorded      varicella 04/22/2002 Recorded      Hep B 04/22/2002 Recorded      Hib (PRP-T) 04/22/2002 Recorded      OPV 2001 Recorded      pneumococcal (PCV7) 2001 Recorded      DTaP 2001 Recorded      OPV 2001 Recorded      pneumococcal (PCV7) 2001 Recorded      DTaP 2001 Recorded      Hep B 2001 Recorded      Hib (PRP-T) 2001 Recorded      pneumococcal (PCV7) 2001 Recorded      OPV 2001 Recorded      DTaP 2001 Recorded      Hep B 2001 Recorded      Hib (PRP-T) 2001 Recorded      pneumococcal (PCV7) 2001 Recorded      rotavirus vaccine - Not Given      rotavirus vaccine - Not Given      rotavirus vaccine - Not Given      IPV - Not Given      IPV - Not Given      IPV - Not Given      IPV - Not Given      Hib (HbOC) - Not Given  Lab Results       Lab Results (Last 4 results within 90 days)        Chlam/N. gonorrhea Comments: See comment (05/23/19 09:58:00)       Chlamydia RNA: NOT DETECTED (05/23/19 09:58:00)       Neisseria gonorrhoeae RNA: NOT DETECTED (05/23/19 09:58:00)

## 2022-02-16 NOTE — PROGRESS NOTES
Patient:   YASSINE ARELLANO            MRN: [id number]            FIN: [id number]               Age:   20 years     Sex:  Female     :  2000   Associated Diagnoses:   Throat pain   Author:   Da Gibson MD      Impression and Plan   Diagnosis     Throat pain (WMY74-GH R07.0).     Course:  Worsening.    Orders     Orders   Charges (Evaluation and Management):  51553 office outpatient visit 15 minutes (Charge) (Order): Quantity: 1, Throat pain.     Orders (Selected)   Outpatient Orders  Ordered  Strep Grp A AG  IA (Rapid Strep) (Request): Priority: Urgent, Throat pain.        Visit Information      Date of Service: 10/07/2020 02:49 pm  Performing Location: [facility]  Encounter#: [id number]      Primary Care Provider (PCP):  Jeanette Conroy    NPI# [id number]      Referring Provider:  Da Gibson MD    NPI# [id number]   Visit type:  Video Visit via Doximity.    Participants in room during visit:  _Patient only   Accompanied by:  No one.    Source of history:  Self.    Location of patient:  _home  Location of provider:  _ Clinic  Video Start Time:  _1524  Video End Time:   _1537    Today's visit was conducted via video conference due to the COVID-19 pandemic.  The patient's consent to proceed with a video visit has been obtained and documented.   Referral source:  Self.    History limitation:  None.       Chief Complaint   10/7/2020 3:15 PM CDT    consent for video visit to discuss sx        History of Present Illness   Patient is a _20 year old _F who is being evaluated via a billable video visit.  Sore throat for several days seems to be getting worse.  Wants a strep test.  Denies: fever, rhinorrhea, cough, SOB, GI symptoms, headache or muscle aches.  Patient believes she had Mono a few months ago.  Had sore throat, cervical lymphadenopathy and profound fatigue but was not tested.  No lymphadenopathy or fatigue this illness so far.       Review of Systems   Constitutional:  Negative.     Eye:  Negative.    Ear/Nose/Mouth/Throat:  Negative.    Respiratory:  Negative.    Cardiovascular:  Negative.    Gastrointestinal:  Negative.    Genitourinary:  Negative.    Immunologic:  Negative.    Musculoskeletal:  Negative.    Integumentary:  Negative.    Neurologic:  Negative.    Psychiatric:  Negative.       Health Status   Allergies:    Allergic Reactions (Selected)  No Known Medication Allergies   Medications:  (Selected)   ,    Medications          No Known Home Medications     Problem list:    All Problems  Major depressive disorder / SNOMED CT 6847551240 / Confirmed      Histories   Past Medical History:    Active  Major depressive disorder ([id number])  Resolved  Inpatient stay ([id number]): Onset on 5/19/2017 at 17 years.  Resolved on 5/24/2017 at 17 years.  Comments:  6/14/2017 CDT 10:16 AM CDT - Vy Bethea  @[facility], MMD, moderate   Family History:    Alcohol abuse  Father (Lennox Sin)  Grandparent     Procedure history:    Closed reduction of dislocation (SNOMED CT 64610197) performed by Wili Bentley MD on 1/29/2009 at 8 Years.  Comments:  8/7/2013 1:00 PM CDT - Chante Davidson  Left radius fracture   Social History:        Alcohol Assessment            Never      Home and Environment Assessment            Lives with Mother, Stepfather, 1 younger brother and 1 younger sister..        Physical Examination   General:  Alert and oriented, No acute distress.    Eye:  Pupils are equal, round and reactive to light, Extraocular movements are intact, Normal conjunctiva.    Respiratory:  Respirations are non-labored.    Integumentary:  Warm, Dry, Pink.    Neurologic:  Normal motor function.    Psychiatric:  Cooperative, Appropriate mood & affect, Normal judgment, Non-suicidal.         Mood and affect: Calm.         Behavior: Relaxed.         Judgment: Able to make sensible decisions, Appropriate in social situations.         Thought process: Appropriate.       Health Maintenance       Recommendations     Pending (in the next year)        OverDue           Body Mass Index Check (Female) due  08/14/20  and every 1  year(s)           Influenza Vaccine due  08/31/20  and every 1  year(s)        Due            Alcohol Misuse Screen (Female) due  10/07/20  and every 1  year(s)           Chlamydia Screen (if sexually active) due  10/07/20  and every 1  year(s)           Gonorrhea Screen (if sexually active) due  10/07/20  and every 1  year(s)           HIV Screen (if sexually active) (Female) due  10/07/20  and every 1  year(s)           Intimate Partner Violence Screening due  10/07/20  and every 1  year(s)           STD Counseling (if sexually active) (Female) due  10/07/20  and every 1  year(s)           Syphilis Screen (if sexually active) (Female) due  10/07/20  and every 1  year(s)        Due In Future            Depression Screen (Female) not due until  01/20/21  and every 1  year(s)           High Blood Pressure Screen (Female) not due until  01/20/21  and every 1  year(s)     Satisfied (in the past 1 year)        Satisfied            Depression Screen (Female) on  01/20/20.           Depression Screen (Female) on  01/20/20.           Depression Screen (Female) on  01/20/20.           High Blood Pressure Screen (Female) on  01/20/20.           Tobacco Use Screen (Female) on  10/07/20.           Tobacco Use Screen (Female) on  07/09/20.

## 2022-02-16 NOTE — PROGRESS NOTES
Chief Complaint    No verbal order to be seen- is a minor would prefer we do not call parents. STD testing. Does not want results sent to dads home.  History of Present Illness      patient present to clinic today for std testing and treatment.  sex partner was recently diagnosed with gonorrhea.  she declines HIV testin or any other std gtesting besides GC/Ct today      Review of systems is negative except as per HPI including:  no fevers, chills, sore throat, runny nose, nausea, vomiting, constipation, diarrhea, rash or new skin lesions, chest pain, palpitations, slurred speech, new paresthesia, shortness of breath or wheeze.      Exam:      General: alert and oriented ×3 no acute distress.      HEENT: pupils are equal round and reactive to light extraocular motion is intact. Normocephalic and atraumatic.       Hearing is grossly normal and there is no otorrhea.       Nares are patent there is no rhinorrhea.       Mucous membranes are moist and pink.      Chest: has bilateral rise with no increased work of breathing.      Cardiovascular: normal perfusion and brisk capillary refill.      Musculoskeletal: no gross focal abnormalities and normal gait.      Neuro: no gross focal abnormalities and memory seems intact.      Psychiatric: speech is clear and coherent and fluent. Patient dressed appropriately for the weather. Mood is appropriate and affect is full.                     Discussed with patient to return to clinic if symptoms worsen or do not improve  Physical Exam   Vitals & Measurements    T: 96.6   F (Tympanic)  HR: 83(Peripheral)  BP: 124/76  SpO2: 98%     WT: 102.2 lb   Assessment/Plan       Exposure to STD (Z20.2)         Ordered:          azithromycin, = 2 tab(s) ( 1,000 mg ), Oral, once, # 2 tab(s), 0 Refill(s), Type: Soft Stop, [id number], 2 tab(s) Oral once, (Ordered)          cefTRIAXone, 250 mg, im, once, (Completed)          90183 therapeutic prophylactic/dx injection  subq/im (Charge), Quantity: 1, Exposure to STD          16319 office outpatient visit 15 minutes (Charge), Quantity: 1, Exposure to STD  Screen for STD (sexually transmitted disease)           ceftriaxone sodium injection, 250 mg (Charge), Quantity: 1, Exposure to STD                Screen for STD (sexually transmitted disease) (Z11.3)         Ordered:          27684 office outpatient visit 15 minutes (Charge), Quantity: 1, Exposure to STD  Screen for STD (sexually transmitted disease)          Chlamydia/Neisseria gonorrhoeae RNA, TMA* ([facility]), Specimen Type: Urine, Collection Date: 02/04/19 12:33:00 CST           Patient Information     Name:YASSINE ARELLANO      Address:      [address] [address]     Sex:Female     YOB: 2000     [phone]     Emergency Contact:BERNIE POLLARD     MRN:[id number]     FIN:[id number]     Location:[facility]     Date of Service:02/04/2019      Primary Care Physician:       NONE ,       Attending Physician:       Kenn Aparicio MDica, [phone]  Problem List/Past Medical History    Ongoing     Major depressive disorder    Historical     Inpatient stay       Comments: @[place] - JAM, MMD, moderate  Procedure/Surgical History     Closed reduction of dislocation (01/29/2009)            Comments: Left radius fracture.  Medications     Zithromax 500 mg oral tablet: 1,000 mg, 2 tab(s), Oral, once, 2 tab(s), 0 Refill(s).          Allergies    No Known Medication Allergies  Social History    Smoking Status - 02/04/2019     Never smoker     Alcohol      Never, 11/07/2018     Home and Environment      Lives with Mother, Stepfather, 1 younger brother and 1 younger sister.., 12/14/2016  Family History    Alcohol abuse: Father and Grandparent.  Immunizations      Vaccine Date Status Comments      influenza (LAIV) 10/15/2015 Given      influenza (LAIV) 10/20/2014 Given      human papillomavirus vaccine 03/12/2014  Given [3/12/2014] Back      Hep A, pediatric/adolescent 03/12/2014 Given [3/12/2014] Front      human papillomavirus vaccine 11/06/2013 Given      human papillomavirus vaccine 08/16/2013 Given      Hep A, pediatric/adolescent 08/16/2013 Given [8/16/2013] L upper      meningococcal conjugate vaccine 08/16/2013 Given [8/16/2013] L lower      varicella 08/02/2012 Given      tetanus/diphth/pertuss (Tdap) adult/adol 08/02/2012 Given      influenza, H1N1, inactivated 12/15/2009 Recorded      OPV 03/31/2006 Recorded      DTaP 03/31/2006 Recorded      MMR (measles/mumps/rubella) 03/31/2006 Recorded      DTaP 09/24/2002 Recorded      MMR (measles/mumps/rubella) 09/24/2002 Recorded      varicella 04/22/2002 Recorded      Hep B 04/22/2002 Recorded      Hib (PRP-T) 04/22/2002 Recorded      OPV 2001 Recorded      pneumococcal (PCV7) 2001 Recorded      DTaP 2001 Recorded      OPV 2001 Recorded      pneumococcal (PCV7) 2001 Recorded      DTaP 2001 Recorded      Hep B 2001 Recorded      Hib (PRP-T) 2001 Recorded      pneumococcal (PCV7) 2001 Recorded      OPV 2001 Recorded      DTaP 2001 Recorded      Hep B 2001 Recorded      Hib (PRP-T) 2001 Recorded      pneumococcal (PCV7) 2001 Recorded      rotavirus vaccine - Not Given      rotavirus vaccine - Not Given      rotavirus vaccine - Not Given      IPV - Not Given      IPV - Not Given      IPV - Not Given      IPV - Not Given      Hib (HbOC) - Not Given

## 2022-02-16 NOTE — NURSING NOTE
Comprehensive Intake Entered On:  10/7/2020 3:16 PM CDT    Performed On:  10/7/2020 3:15 PM CDT by Carolann Padilla CMA               Summary   Chief Complaint :   consent for video visit to discuss sx   Menstrual Status :   Menarcheal   Height/Length Estimated :   65.75 in(Converted to: 5 ft 6 in, 167.00 cm)    Languages :   English   Carolann Padilla CMA - 10/7/2020 3:15 PM CDT   Health Status   Allergies Verified? :   Yes   Medication History Verified? :   Yes   Medical History Verified? :   Yes   Tobacco Use? :   Never smoker   Carolann Padilla CMA - 10/7/2020 3:15 PM CDT   Consents   Consent for Immunization Exchange :   Consent Granted   Consent for Immunizations to Providers :   Consent Granted   Carolann Padilla CMA 10/7/2020 3:15 PM CDT   Meds / Allergies   (As Of: 10/7/2020 3:16:13 PM CDT)   Allergies (Active)   No Known Medication Allergies  Estimated Onset Date:   Unspecified ; Created By:   Tamra Booker CMA; Reaction Status:   Active ; Category:   Drug ; Substance:   No Known Medication Allergies ; Type:   Allergy ; Updated By:   Tamra Booker CMA; Reviewed Date:   10/7/2020 3:15 PM CDT        Medication List   (As Of: 10/7/2020 3:16:13 PM CDT)   No Known Home Medications     Carolann Padilla CMA - 10/7/2020 3:15:36 PM           ID Risk Screen   Recent Travel History :   No recent travel   Family Member Travel History :   No recent travel   Other Exposure to Infectious Disease :   Unknown   Carolann Padilla CMA 10/7/2020 3:15 PM CDT

## 2022-02-16 NOTE — LETTER
(Inserted Image. Unable to display)            July 21, 2021        YASSINE ARELLANO      [address] [place] [address]        Dear YASSINE,    Thank you for selecting [facility]  for your healthcare needs.  Below you will find the results of the recent tests done at our clinic.     All labs acceptable.      Result Name Current Result Previous Result Reference Range   HIV Ag/Ab  NON-REACTIVE 7/20/2021  NONREACTIVE - NONREACTIVE   RPR Ql  NON-REACTIVE 7/20/2021  NONREACTIVE - NONREACTIVE   Chlamydia RNA  NOT DETECTED 7/20/2021  NOT DETECTED 1/20/2020 NOT DETECTED -    Neisseria gonorrhoeae RNA  NOT DETECTED 7/20/2021  NOT DETECTED 1/20/2020 NOT DETECTED -        Please contact me or my assistant at [phone] if you have any questions.     Sincerely,        Sid Smith MD        What do your labs mean?  Below is a glossary of commonly ordered labs:  LDL   Bad Cholesterol   HDL   Good Cholesterol  AST/ALT   Liver Function   Cr/Creatinine   Kidney Function  Microalbumin   Kidney Function  BUN   Kidney Function  PSA   Prostate    TSH   Thyroid Hormone  HgbA1c   Diabetes Test   Hgb (Hemoglobin)   Red Blood Cells

## 2022-02-16 NOTE — PROGRESS NOTES
Patient:   CINTHIA ARELLANO            MRN: [id number]            FIN: [id number]               Age:   17 years     Sex:  Female     :  2000   Associated Diagnoses:   None   Author:   Jeanine Carreon      Chief Complaint   3/15/2018 4:35 PM CDT    renew depo order      History of Present Illness   PPC for renewal of her depo, mother, Isabel is not presnet, and I did cotnact her vis phone, Isabel agrees with on going birth control for Cinthia.  Cinthia is sexually active, boyfriend is in army and will be coming home next weekend, they have been dating for 2 years, she has had weight gain on depo but denies increased depression or anxiety  she declines STD testing.  last ruma and sports physical done       Review of Systems   Constitutional:  Negative.    Ear/Nose/Mouth/Throat:  Negative.    Respiratory:  Negative.    Cardiovascular:  Negative.    Gastrointestinal:  Negative.    Gynecologic:  Negative except as documented in history of present illness.    Hematology/Lymphatics:  Negative.    Immunologic:  Negative.    Musculoskeletal:  Negative.    Integumentary:  Negative.    Neurologic:  Negative.    Psychiatric:  Negative.       Health Status   Allergies:    Allergic Reactions (Selected)  No known allergies   Medications:  (Selected)   Prescriptions  Prescribed  Depo-Provera Contraceptive 150 mg/mL intramuscular suspension: ( 150 mg ), im, once, Instructions: Order expires 2018, # 1 mL, 0 Refill(s), Type: Soft Stop, other reason (Rx)   Problem list:    All Problems (Selected)  Major depressive disorder / SNOMED CT 0405360477 / Confirmed      Histories   Past Medical History:    Active  Major depressive disorder (3056403699)  Resolved  Inpatient stay ([id number]): Onset on 2017 at 17 years.  Resolved on 2017 at 17 years.  Comments:  2017 CDT 10:16 AM CDT - Vy Bethea  @[place] - [facility], MMD, moderate      Physical Examination   Vital Signs   3/15/2018 4:35 PM CDT Peripheral Pulse  Rate 80 bpm    Pulse Site Radial artery    HR Method Manual    Systolic Blood Pressure 118 mmHg    Diastolic Blood Pressure 78 mmHg    Mean Arterial Pressure 91 mmHg    BP Site Right arm    BP Method Manual      Measurements from flowsheet : Measurements   3/15/2018 4:35 PM CDT    Weight Measured - Standard                179 lb     General:  Alert and oriented, No acute distress.    Eye:  Pupils are equal, round and reactive to light.    HENT:  Normocephalic.    Respiratory:  Respirations are non-labored.    Cardiovascular:  Regular rhythm, No edema.    Musculoskeletal:  Normal range of motion, Normal gait.    Integumentary:  Warm, Dry, Pink.    Neurologic:  Alert, Oriented, Normal sensory, No focal deficits.    Psychiatric:  Cooperative, Appropriate mood & affect, Normal judgment.       Impression and Plan   Patient Instructions:       Counseled: Patient, Regarding diagnosis, Regarding treatment, Regarding medications, Activity, Verbalized understanding.    Summary:  continue with depo injection, discussed long term risks of on going depo use   at this time pt has declined STD testing, she understands risks of depo use and risk of weigh gain which she has noticed  she is requesting to remain on this  .

## 2022-02-16 NOTE — PROGRESS NOTES
Patient:   YASSINE ARELLANO            MRN: [id number]            FIN: [id number]               Age:   20 years     Sex:  Female     :  2000   Associated Diagnoses:   None   Author:   Da Gibson MD      10/08/202    Patient given result of RST by phone. NEGATIVE. Symptom treatment recommended.    Da Gibson M.D.

## 2022-02-16 NOTE — PROGRESS NOTES
Patient:   CINTHIA ARELLANO            MRN: [id number]            FIN: [id number]               Age:   19 years     Sex:  Female     :  2000   Associated Diagnoses:   Headache; Irregular menstrual bleeding; Pelvic pain; RUQ pain   Author:   Jeanette Conroy      Chief Complaint   2019 7:55 AM CDT    c/o  irregular periods- bad cramps and heavy bleeding , migranes x 1 1/2 weeks      History of Present Illness   Chief complaint and concerns discussed with patient and confirmed as above.  Cinthia would like a number of things evaluated. She is concerned about not being able to return for follow up eval/assessment because of cost and limited time. She works multiple jobs.  She has had a recent UTI, was on antibiotics  Is sexually active with same partner of 3 years. Used Depo about 18 months, stopped it about 9 months ago. Started it because of ease/reliability and irreg periods. It helped with bleeding till the last few months of use then started bleeding all the time. She and partner using condoms but not all the time. Last IC 3 days ago but no condoms.  Denies vaginal discharge, sores or pain with IC. She has notice 'swelling' inside the vagina and had that with last UTI. Denies having pain with urination or frequency recently.   Has been bleeding every day since MAy, stopped 2 days ago.  Sometimes heavy with small clots    also concerned about episode 1 1/2 weeks with bad HA, usually never gets HA, and body aches all over. No rash, no  fever. Sleeps with her mom's dogs. Has not had Lyme in past. This episode resolved    Also concerned about RUQ pain that started 3 days ago, felt like a cramp from running but hasn't resolved. No stool changes, no vomiting or nausea. Pain is present today      Health Status   Allergies:    Allergic Reactions (Selected)  No Known Medication Allergies   Medications:  (Selected)      Problem list:    All Problems  Major depressive disorder / SNOMED CT 2325804373 /  Confirmed  Resolved: Inpatient stay / SNOMED CT 875239700  @[facility], MMD, moderate      Histories   Past Medical History:    Active  Major depressive disorder ([id number])  Resolved  Inpatient stay (926396694): Onset on 5/19/2017 at 17 years.  Resolved on 5/24/2017 at 17 years.  Comments:  6/14/2017 CDT 10:16 AM CDT - Vy Bethea  @[place] [facility], MMD, moderate   Family History:    Alcohol abuse  Father (Lennox Sin)  Grandparent     Procedure history:    Closed reduction of dislocation (SNOMED CT 46574445) performed by Wili Bentley MD on 1/29/2009 at 8 Years.  Comments:  8/7/2013 1:00 PM CDT - Chante Davidson  Left radius fracture   Social History:        Alcohol Assessment            Never      Home and Environment Assessment            Lives with Mother, Stepfather, 1 younger brother and 1 younger sister..        Physical Examination   Vital Signs   8/14/2019 7:55 AM CDT Temperature Tympanic 97.4 DegF  LOW    Peripheral Pulse Rate 67 bpm    HR Method Electronic    Systolic Blood Pressure 124 mmHg    Diastolic Blood Pressure 80 mmHg    Mean Arterial Pressure 95 mmHg    BP Site Right arm    BP Method Manual    Oxygen Saturation 97 %      Measurements from flowsheet : Measurements   8/14/2019 7:55 AM CDT Height Measured - Standard 65.75 in    Weight Measured - Standard 168.2 lb    BSA 1.88 m2    Body Mass Index 27.35 kg/m2    Body Mass Index Percentile 89.82      General:  Alert and oriented, No acute distress.    Eye:  Normal conjunctiva.    HENT:  Normal hearing, Oral mucosa is moist, No pharyngeal erythema.    Neck:  Non-tender, No lymphadenopathy.    Respiratory:  Lungs are clear to auscultation, Respirations are non-labored, Breath sounds are equal.    Cardiovascular:  Normal rate, Regular rhythm, No murmur.    Gastrointestinal:  Soft, Non-distended, No organomegaly, mild RUQ tenderness with palpation  Liver edge  percussed at Right costal margin.    Musculoskeletal:  Normal range of motion,  Normal gait.    Integumentary:  Warm, Dry, Pink, No rash.    Neurologic:  Alert, Oriented, Normal sensory, Normal motor function, No focal deficits, Cranial Nerves II-XII are grossly intact.    Psychiatric:  Cooperative, Appropriate mood & affect.       Review / Management   Results review:  Lab results   8/14/2019 8:47 AM CDT Wet Prep Yeast None Seen    Wet Prep Trichomonas None Seen    Wet Prep Clue Cells None Seen   8/14/2019 8:44 AM CDT U HCG POC NEGATIVE    UA pH < OR = 5.0    UA Specific Gravity > OR = 1.030    UA Glucose NEGATIVE    UA Bilirubin NEGATIVE    UA Ketones NEGATIVE    Urine Occult Blood NEGATIVE    UA Protein NEGATIVE    UA Nitrite NEGATIVE    UA Leukocyte Esterase NEGATIVE   .       Impression and Plan   Diagnosis     Headache (SHH46-GP R51).     Irregular menstrual bleeding (NUY07-QB N92.6).     Pelvic pain (JOZ01-NC R10.2).     RUQ pain (GXM04-XU R10.11).     Plan:  await labs  can not r/o pregnancy, offered Plan B, declined. Will start Nuva Ring, educated on use/risk/benefits including ACHES . If no menses with placebo pills, needs repeat UPT.    Will set up pelvic US in light of pain and prolonged bleeding, doubt infection causing symptoms..    Patient Instructions:       Counseled: Patient, Regarding diagnosis, Regarding treatment, Regarding medications, Verbalized understanding.    Orders     Orders (Selected)   Outpatient Orders  Ordered  US Pelvic (Request): Instructions: please call pt to schedule, Irregular menstrual bleeding  Pelvic pain  Ordered (In Transit)  Amylase* ([facility]): Specimen Type: Serum, Collection Date: 08/14/19 8:23:00 CDT  CBC (includes diff/plt)* ([facility]): Specimen Type: Blood, Collection Date: 08/14/19 8:23:00 CDT  Culture, Urine, Routine* ([facility]): Specimen Type: Urine (Clean Catch), Collection Date: 08/14/19 8:23:00 CDT  Lyme disease antibody, total, eia with reflex to western blot (igg,igm)* ([facility]): Specimen Type: Serum, Collection Date: 08/14/19 8:23:00  CDT  Prescriptions  Prescribed  NuvaRing 0.120 mg-0.015 mg/24 hours vaginal rin EA, VAG, q4 wks, Instructions: as directed--dispense 1 box of 3, # 1 box(es), 0 Refill(s), Type: Maintenance, Pharmacy: [id number], 1 EA VAG q4 wks,Instr:as directed--dispense 1 box of 3.

## 2022-02-16 NOTE — NURSING NOTE
Depression Screening Entered On:  1/21/2020 9:24 AM CST    Performed On:  1/20/2020 9:23 AM CST by Elisa Brooks CMA               Depression Screening   Little Interest - Pleasure in Activities :   More than half the days   Feeling Down, Depressed, Hopeless :   More than half the days   Initial Depression Screen Score :   4    Trouble Falling or Staying Asleep :   Nearly every day   Feeling Tired or Little Energy :   Several days   Poor Appetite or Overeating :   Nearly every day   Feeling Bad About Yourself :   More than half the days   Trouble Concentrating :   Nearly every day   Moving or Speaking Slowly :   More than half the days   Thoughts Better Off Dead or Hurting Self :   Several days   Detailed Depression Screen Score :   15    Total Depression Screen Score :   19    JAM Difficulty with Work, Home, Others :   Very difficult   Elisa Brooks CMA - 1/21/2020 9:23 AM CST

## 2022-02-16 NOTE — NURSING NOTE
Comprehensive Intake Entered On:  2/4/2019 12:06 PM CST    Performed On:  2/4/2019 11:57 AM CST by Tamra Booker CMA               Summary   Chief Complaint :   No verbal order to be seen- is a minor would prefer we do not call parents. STD testing. Does not want results sent to dads home.    Menstrual Status :   Menarcheal   Weight Measured :   102.2 lb(Converted to: 102 lb 3 oz, 46.36 kg)    Systolic Blood Pressure :   124 mmHg   Diastolic Blood Pressure :   76 mmHg   Mean Arterial Pressure :   92 mmHg   Peripheral Pulse Rate :   83 bpm   BP Site :   Right arm   BP Method :   Manual   HR Method :   Electronic   Temperature Tympanic :   96.6 DegF(Converted to: 35.9 DegC)  (LOW)    Oxygen Saturation :   98 %   Languages :   English   Tamra Booker CMA - 2/4/2019 11:57 AM CST   Health Status   Allergies Verified? :   Yes   Medication History Verified? :   Yes   Pre-Visit Planning Status :   Completed   Tobacco Use? :   Never smoker   Tamra Booker CMA - 2/4/2019 11:57 AM CST   Consents   Consent for Immunization Exchange :   Consent Granted   Consent for Immunizations to Providers :   Consent Granted   Tamra Booker CMA - 2/4/2019 11:57 AM CST   Meds / Allergies   (As Of: 2/4/2019 12:06:58 PM CST)   Allergies (Active)   No Known Medication Allergies  Estimated Onset Date:   Unspecified ; Created By:   Tamra Booker CMA; Reaction Status:   Active ; Category:   Drug ; Substance:   No Known Medication Allergies ; Type:   Allergy ; Updated By:   Tamra Booker CMA; Reviewed Date:   2/4/2019 12:05 PM CST        Medication List   (As Of: 2/4/2019 12:06:58 PM CST)

## 2022-02-16 NOTE — PROGRESS NOTES
Chief Complaint    Bleeding after IC - describes as light/spotting. Some cramping. Sharp pains in lower right side back. Nausea. Very irregular menses - may have been about 3 mo ago LMP. Feels vagina swollen and sore. Denies dysuria. Also redness on neck and upper body.  History of Present Illness      Patient is an 19-year-old female who complains of bleeding after intercourse last night.  She has some lower abdominal cramping.  Positive nausea, no vomiting or diarrhea.  Some low right sided abdominal pain which is off and on.  This pain has been off and on for several months.  No fever or chills.  No new medications.  She was seen for similar symptoms in August 2019.  A vaginal ultrasound was ordered but the patient did not follow-up with the exam.      She denies vaginal symptoms including no itching, discharge, or odor.      No dysuria, hematuria, urinary urgency or frequency.       She previously had been on Depo-Provera for several years.  Her last Depo shot was October 2018.  Her last menses was about 3 months ago.  Her menstrual cycle is very irregular now since being off of the Depo.               Patient also complains of some right-sided low back pain.  There is no numbness or tingling in her legs.  She has a history of back pain in the same spot for a few years.  She was a  in high school.       She notes a redness to her neck on the front which started yesterday.  no sore throat.  no trouble breathing.  no new foods.       She reports poor sleep.  She works 2 jobs.  One job is overnights.  She has trouble falling asleep and staying asleep.  has tried melatonin and has not had success with that.       She reports that her diet is poor.  Not many vegetables and fruits.  high in junk food.        No regular exercise.       Patient also complains about returning depression.  She reports a history of depression and she used to have suicidal ideation.  She was prescribed Prozac in the past  but never took it.  She does feel more depressed again now.  She denies suicidal ideation.  She has been talking with her sister and she would like to consider going on medication.          Review of Systems      Negative except as listed in HPI.  Physical Exam   Vitals & Measurements    T: 98.0   F (Tympanic)  HR: 68(Peripheral)  BP: 118/74     WT: 175 lb       Vitals noted and normal.      Patient is alert, oriented and in no acute distress.      Anterior neck with erythematous, blotchy skin.      Mouth: mucous membranes pink and moist, pharynx not erythematous      Neck is supple with no lymphadenopathy      Heart: regular rate and rhythm with no murmur      Lungs: clear to auscultation bilaterally      Abdomen: soft, nondistended, nontender.  no guarding.      Back with tenderness to palpation of superior aspect of right SI joint.      no tenderness to lumbar spine or paraspinal muscles      no erythema or ecchymosis to the skin in this area      : normal female external genitalia      vaginal mucosa moist      speculum inserted without difficulty      cervix is normal with no lesions.  not friable.      blood in vaginal vault appears consistent with menses      swabs obtained for wet prep and GC/Chlamydia      bimanual exam reveals a normal, anteverted uterus and negative adnexa.      wet prep positive for clue cells      PHQ9 score of 19  Assessment/Plan       Bacterial vaginosis (N76.0)         metronidazole as directed                Chronic right SI joint pain (M53.3)         referral to physical therapy         Ordered:          Physical Therapy (Request), Chronic right SI joint pain                Depression (F32.9)         discussed importance of taking care of your body including eating vegetables and fruits to give your body the building blocks to make neurotransmitters, enough sleep for rest and repair and how sleep can help mood, exercise can help release positive endorphins        discussed risks  and benefits of medication        discussed suicidal ideation and patient reports no current suicidal ideation.  she states she would call her sister or come to the clinic or ER if she developed suicidal thoughts or plans.        recommended counseling and discussed how this is as beneficial as medication and you get better outcomes if you do both meds and counseling         RTC 3 weeks for recheck         Ordered:          FLUoxetine, = 1 cap(s) ( 20 mg ), Oral, daily, Instructions: for the first week take 1/2 tab daily, # 30 cap(s), 0 Refill(s), Type: Maintenance, Pharmacy: [facility] #[id number], 1 cap(s) Oral daily,Instr:for the first week take 1/2 tab daily, (Ordered)          Referral (Request), 01/20/20 17:05:00 CST, Referred to: Behavioral Health, Referred to: counseling, Reason for referral: depression, Depression                Irregular menstruation (N92.6)         discussed that the post coital bleeding is likely due to the irregular menses        patient would like to try the nuva ring.  this was prescribed in Aug 2019 but patient did not start it yet.                Pelvic pain (R10.2)         recommended going forward with the pelvic ultrasound (as ordered in Aug 2019) to evaluate the ongoing intermittant right lower abdominal pain         Ordered:          US Pelvic (Request), Pelvic pain          Wet Prep Vaginal (Request), Priority: Urgent, Pelvic pain                Orders:         metroNIDAZOLE, = 1 tab(s) ( 500 mg ), Oral, bid, x 7 day(s), # 14 tab(s), 0 Refill(s), Type: Acute, Pharmacy: [facility] #[id number], 1 tab(s) Oral bid,x7 day(s), (Ordered)         Chlamydia/Neisseria gonorrhoeae RNA, TMA* ([facility]), Specimen Type: Swab, Collection Date: 01/20/20 16:38:00 CST         Return to Clinic (Request), RFV: recheck depression, Return in 3 weeks  Patient Information     Name:YASSINE ARELLANO      Address:      [address] [place] [id number]     Sex:Female      YOB: 2000     [phone]     Emergency Contact:BERNIE POLLARD     MRN:[id number]     FIN:[id number]     [facility]     Date of Service:01/20/2020      Primary Care Physician:       Jeanette Conroy, [phone]      Attending Physician:       Geovanna Green MD, [phone]  Problem List/Past Medical History    Ongoing     Major depressive disorder    Historical     Inpatient stay       Comments: @[place] - JAM, MMD, moderate  Procedure/Surgical History     Closed reduction of dislocation (01/29/2009)      Comments: Left radius fracture.  Medications    metroNIDAZOLE 500 mg oral tablet, 500 mg= 1 tab(s), Oral, bid    NuvaRing 0.120 mg-0.015 mg/24 hours vaginal ring, 1 EA, Vaginal, q4 wks,   Not taking    PROzac 20 mg oral capsule, 20 mg= 1 cap(s), Oral, daily  Allergies    No Known Medication Allergies  Social History    Smoking Status - 08/14/2019     Never smoker     Alcohol      Never, 11/07/2018     Home/Environment      Lives with Mother, Stepfather, 1 younger brother and 1 younger sister.., 12/14/2016  Family History    Alcohol abuse: Father and Grandparent.  Immunizations      Vaccine Date Status          influenza (LAIV) 10/15/2015 Given          influenza (LAIV) 10/20/2014 Given          human papillomavirus vaccine 03/12/2014 Given              Comments : [3/12/2014] Back          Hep A, pediatric/adolescent 03/12/2014 Given              Comments : [3/12/2014] Front          human papillomavirus vaccine 11/06/2013 Given          human papillomavirus vaccine 08/16/2013 Given          Hep A, pediatric/adolescent 08/16/2013 Given              Comments : [8/16/2013] L upper          meningococcal conjugate vaccine 08/16/2013 Given              Comments : [8/16/2013] L lower          varicella 08/02/2012 Given          tetanus/diphth/pertuss (Tdap) adult/adol 08/02/2012 Given          influenza, H1N1, inactivated 12/15/2009 Recorded          OPV  03/31/2006 Recorded          DTaP 03/31/2006 Recorded          MMR (measles/mumps/rubella) 03/31/2006 Recorded          DTaP 09/24/2002 Recorded          MMR (measles/mumps/rubella) 09/24/2002 Recorded          varicella 04/22/2002 Recorded          Hep B 04/22/2002 Recorded          Hib (PRP-T) 04/22/2002 Recorded          OPV 2001 Recorded          pneumococcal (PCV7) 2001 Recorded          DTaP 2001 Recorded          OPV 2001 Recorded          pneumococcal (PCV7) 2001 Recorded          DTaP 2001 Recorded          Hep B 2001 Recorded          Hib (PRP-T) 2001 Recorded          pneumococcal (PCV7) 2001 Recorded          OPV 2001 Recorded          DTaP 2001 Recorded          Hep B 2001 Recorded          Hib (PRP-T) 2001 Recorded          pneumococcal (PCV7) 2001 Recorded          rotavirus vaccine - Not Given          rotavirus vaccine - Not Given          rotavirus vaccine - Not Given          IPV - Not Given          IPV - Not Given          IPV - Not Given          IPV - Not Given          Hib (HbOC) - Not Given  Lab Results       Lab Results (Last 4 results within 90 days)        Wet Prep Yeast: None Seen (01/20/20 17:02:00)       Wet Prep Trichomonas: None Seen (01/20/20 17:02:00)       Wet Prep Clue Cells: Present (01/20/20 17:02:00)

## 2022-02-16 NOTE — NURSING NOTE
Comprehensive Intake Entered On:  7/20/2021 4:08 PM CDT    Performed On:  7/20/2021 4:03 PM CDT by Shavonne Cobos LPN               Summary   Chief Complaint :   vaginal yeast infection, OTC medication has improved, continues to have discharge.    Menstrual Status :   Menarcheal   Weight Measured :   156 lb(Converted to: 156 lb 0 oz, 70.760 kg)    Height Measured :   65 in(Converted to: 5 ft 5 in, 165.10 cm)    Body Mass Index :   25.96 kg/m2 (HI)    Body Surface Area :   1.8 m2   Height/Length Estimated :   65.75 in(Converted to: 5 ft 6 in, 167.00 cm)    Systolic Blood Pressure :   136 mmHg (HI)    Diastolic Blood Pressure :   82 mmHg (HI)    Mean Arterial Pressure :   100 mmHg   Peripheral Pulse Rate :   71 bpm   BP Site :   Right arm   Pulse Site :   Radial artery   BP Method :   Electronic   HR Method :   Electronic   Temperature Tympanic :   98.3 DegF(Converted to: 36.8 DegC)    Languages :   English   Shavonne Cobos LPN - 7/20/2021 4:03 PM CDT   Health Status   Allergies Verified? :   Yes   Medication History Verified? :   Yes   Pre-Visit Planning Status :   Completed   Tobacco Use? :   Former smoker   Shavonne Cobos LPN - 7/20/2021 4:03 PM CDT   Consents   Consent for Immunization Exchange :   Consent Granted   Consent for Immunizations to Providers :   Consent Granted   Shavonne Cobos LPN - 7/20/2021 4:03 PM CDT   Meds / Allergies   (As Of: 7/20/2021 4:08:21 PM CDT)   Allergies (Active)   No Known Medication Allergies  Estimated Onset Date:   Unspecified ; Created By:   Tamra Booker CMA; Reaction Status:   Active ; Category:   Drug ; Substance:   No Known Medication Allergies ; Type:   Allergy ; Updated By:   Tamra Booker CMA; Reviewed Date:   7/20/2021 4:07 PM CDT        Medication List   (As Of: 7/20/2021 4:08:21 PM CDT)        Social History   Social History   (As Of: 7/20/2021 4:08:21 PM CDT)   Alcohol:        Never   (Last Updated: 11/7/2018 2:00:01 PM CST by Archana Aguilar)           Tobacco:        Never (less than 100 in lifetime)   (Last Updated: 7/20/2021 4:04:02 PM CDT by Shavonne Cobos LPN)          Electronic Cigarette/Vaping:        Electronic Cigarette Use: Never.   (Last Updated: 7/20/2021 4:04:07 PM CDT by Shavonne Cobos LPN)          Home/Environment:        Lives with Mother, Stepfather, 1 younger brother and 1 younger sister..   (Last Updated: 12/14/2016 10:02:56 PM CST by Vy Bethea)

## 2022-02-16 NOTE — TELEPHONE ENCOUNTER
---------------------  From: Vivi Aparicio MD   To: PATRICIA Message Pool ([id number] - [place]);     Sent: 2/13/2019 12:24:29 AM CST  Subject: General Message     please call patient to let her know her tests for GC and CT were neg, thanksdone.

## 2022-02-16 NOTE — LETTER
(Inserted Image. Unable to display)   May 24, 2019        YASSINE ADAN       [address] [place], [place] [id number]        Dear YASSINE,    Thank you for choosing [facility] for your healthcare needs. Below you will find the results of your recent test(s) done at our clinic.      Your tests are negative      Result Name Current Result Previous Result Reference Range   Chlam/N. gonorrhea Comments  See comment 5/23/2019  See comment 2/4/2019    Chlamydia RNA  NOT DETECTED 5/23/2019  NOT DETECTED 2/4/2019 NOT DETECTED -    Neisseria gonorrhoeae RNA  NOT DETECTED 5/23/2019  NOT DETECTED 2/4/2019 NOT DETECTED -        Please contact me or my assistant at [phone] if you have any questions or concerns.     Sincerely,        Freddy Acuña MD        What do your labs mean?  Below is a glossary of commonly ordered labs:  LDL   Bad Cholesterol   HDL   Good Cholesterol  AST/ALT   Liver Function   Cr/Creatinine   Kidney Function  Microalbumin   Kidney Function  BUN   Kidney Function  PSA   Prostate    TSH   Thyroid Hormone  HgbA1c   Diabetes Test   Hgb (Hemoglobin)   Red Blood Cells

## 2022-02-16 NOTE — TELEPHONE ENCOUNTER
---------------------  From: Seng PRICEKathy (Phone Messages [place] ([id number]_[facility]))   Sent: 7/8/2020 2:18:16 PM CDT  Subject: General Message     Phone Message    PCP:   KEN      Time of Call:  1:20pm       Person Calling:  pt  Phone number:  [phone]    Returned call at:  2:07pm    Note:   Pt LM stating she had covid testing Monday and came back negative today. Pt says she still has a fever, headache, backache and swollen lymph nodes in her neck.  Pt wondering if she should be going back to work tomorrow. Pt wondering if she should come in to be seen.    Returned call. Pt had covid testing done at [facility] in [place] on Monday and got negative results back today.  Pt denies sore throat- says it is just dry. Pt says her headaches are bad and still has a back ache- pt says he does have back problems as well. Pt says fever Monday was 101.5 and today 101.2.  Pt says she was only able to feel 1 swollen lymph node but can now feel 2 or 3.    Offered video appt for pt to discuss with a provider and see if any additional testing would be appropriate. Pt declines and says she really does not have the money to make another appt if there is nothing that can be done.    Pt says she is scheduled to go back to work tomorrow. Told pt that covid results are not 100% accurate and she should not return to work if she still has fever/symptoms. Pt says that is not an option for her because of bills. Told pt if she scheduled a video visit a work note could be discussed if that is a concern.    Pt says she will text her boss and call back if she decides she would like to proceed with video visit.    Last office visit and reason:  2/5/20 gyn visit- Dr. Tim

## 2022-02-16 NOTE — NURSING NOTE
Comprehensive Intake Entered On:  1/20/2020 4:21 PM CST    Performed On:  1/20/2020 4:12 PM CST by Elisa Brooks CMA               Summary   Chief Complaint :   Bleeding after IC - describes as light/spotting. Some cramping. Sharp pains in lower right side back. Nausea. Very irregular menses - may have been about 3 mo ago LMP. Feels vagina swollen and sore. Denies dysuria. Also redness on neck and upper body.   Menstrual Status :   Menarcheal   Weight Measured :   175 lb(Converted to: 175 lb 0 oz, 79.38 kg)    Systolic Blood Pressure :   118 mmHg   Diastolic Blood Pressure :   74 mmHg   Mean Arterial Pressure :   89 mmHg   Peripheral Pulse Rate :   68 bpm   BP Site :   Right arm   Pulse Site :   Radial artery   BP Method :   Manual   HR Method :   Manual   Temperature Tympanic :   98.0 DegF(Converted to: 36.7 DegC)    Languages :   English   Elisa Brooks CMA - 1/20/2020 4:12 PM CST   Health Status   Allergies Verified? :   Yes   Medication History Verified? :   Yes   Pre-Visit Planning Status :   Not completed   Elisa Brooks CMA - 1/20/2020 4:12 PM CST   Meds / Allergies   (As Of: 1/20/2020 4:21:22 PM CST)   Allergies (Active)   No Known Medication Allergies  Estimated Onset Date:   Unspecified ; Created By:   aTmra Booker CMA; Reaction Status:   Active ; Category:   Drug ; Substance:   No Known Medication Allergies ; Type:   Allergy ; Updated By:   Tamra Booker CMA; Reviewed Date:   8/14/2019 8:02 AM CDT        Medication List   (As Of: 1/20/2020 4:21:22 PM CST)   Prescription/Discharge Order    ethinyl estradiol-etonogestrel  :   ethinyl estradiol-etonogestrel ; Status:   Prescribed ; Ordered As Mnemonic:   NuvaRing 0.120 mg-0.015 mg/24 hours vaginal ring ; Simple Display Line:   1 EA, VAG, q4 wks, as directed--dispense 1 box of 3, 1 box(es), 0 Refill(s) ; Ordering Provider:   Jeanette Conroy; Catalog Code:   ethinyl estradiol-etonogestrel ; Order Dt/Tm:   8/14/2019 11:34:21 AM CDT

## 2022-02-16 NOTE — TELEPHONE ENCOUNTER
Entered by Tamra Booker CMA on February 06, 2019 1:09:20 PM CST  Results are in chart. LDVM with negative GC/Chlamydia at 1308. Advised patient to call back if she had any further questions.       ---------------------  From: Vivi Aparicio MD   To: [employer] Pool ([id number]_[place] - [place]);     Sent: 2/4/2019 12:32:53 PM CST  Subject: General Message     please help watch for test results, she will need a call to her cell phone, [phone], with results, ok to leave a detailed message, add chart alert that results she be by phone only, not letter

## 2022-02-16 NOTE — LETTER
(Inserted Image. Unable to display)   August 16, 2019      YASSINE ARELLANO      [address] [place] [phone]        Dear YASSINE,    Thank you for selecting [facility] for your healthcare needs.  Below you will find the results of the recent tests done at our clinic.      The urine culture is negative for infection.      Result Name Current Result   Culture Urine ((A)) See comment 8/14/2019       Please contact me or my assistant at [phone] if you have any questions or concerns.     Sincerely,        ALEXANDER Kevin-NP  Family Nurse Practitioner      What do your labs mean?  Below is a glossary of commonly ordered labs:  LDL   Bad Cholesterol   HDL   Good Cholesterol  AST/ALT   Liver Function   Cr/Creatinine   Kidney Function  Microalbumin   Kidney Function  BUN   Kidney Function  PSA   Prostate    TSH   Thyroid Hormone  HgbA1c   Diabetes Test   Hgb (Hemoglobin)   Red Blood Cells  WBC   White Blood Cell Count

## 2022-02-16 NOTE — TELEPHONE ENCOUNTER
---------------------  From: Jeanette Conroy   To: KEN Message Pool ([id number]_[facility]);     Sent: 7/9/2020 8:34:28 AM CDT  Subject: General Message     please print and sign work note I created for her and put at , she will  later todaydone.

## 2022-08-29 ENCOUNTER — NURSE TRIAGE (OUTPATIENT)
Dept: NURSING | Facility: CLINIC | Age: 22
End: 2022-08-29

## 2022-08-29 NOTE — TELEPHONE ENCOUNTER
Patient calling, because she states she has a knoin her back , and wants to know if she needs to be seen.    Patient states the knot in back , started after she had her period.    Patient advised to be seen if this is still bothering her.   She reports she does not want to pay for a visit if she does not need to go in.  Guillermina Fountain RN   [facility] Nurse Advisor    Reason for Disposition    Back pain    Additional Information    Negative: Caused by overuse from recent vigorous activity (e.g., exercise, gardening, lifting and carrying, sports)    Negative: Caused by a twisting, bending, or lifting injury    Negative: Back pain lasts > 2 weeks    Negative: Back pain is a chronic symptom (recurrent or ongoing AND lasting > 4 weeks)    Negative: MODERATE back pain (e.g., interferes with normal activities) and present > 3 days    Negative: Pain radiates into the thigh or further down the leg    Negative: Patient wants to be seen    Negative: Age > 50 and no history of prior similar back pain    Negative: SEVERE back pain (e.g., excruciating, unable to do any normal activities) and not improved after pain medicine and CARE ADVICE    Negative: Numbness in an arm or hand (i.e., loss of sensation) and upper back pain    Negative: Numbness in a leg or foot (i.e., loss of sensation)    Negative: High-risk adult (e.g., history of cancer, history of HIV, or history of IV Drug Use)    Negative: Soft tissue infection (e.g., abscess, cellulitis) or other serious infection (e.g., bacteremia) in last 2 weeks    Negative: Painful rash with multiple small blisters grouped together (i.e., dermatomal distribution or 'band' or 'stripe')    Negative: Pain radiates into the thigh or further down the leg, and in both legs    Negative: Fever > 100.4 F (38.0 C) and flank pain    Negative: Pain or burning with passing urine (urination)    Negative: Pain radiates into groin, scrotum    Negative: Blood in urine (red, pink, or  tea-colored)    Negative: Vomiting and pain over lower ribs of back (i.e., flank - kidney area)    Negative: Weakness of a leg or foot (e.g., unable to bear weight, dragging foot)    Negative: Patient sounds very sick or weak to the triager    Negative: SEVERE back pain of sudden onset and age > 60 years    Negative: SEVERE abdominal pain (e.g., excruciating)    Negative: Abdominal pain and age > 60 years    Negative: Unable to urinate (or only a few drops) and bladder feels very full    Negative: Loss of bladder or bowel control (urine or bowel incontinence; wetting self, leaking stool) of new-onset    Negative: Numbness (loss of sensation) in groin or rectal area    Negative: Major injury to the back (e.g., MVA, fall > 10 feet or 3 meters, penetrating injury, etc.)    Negative: Pain in the upper back over the ribs (rib cage) that radiates (travels) into the chest    Negative: Pain in the upper back over the ribs (rib cage) and worsened by coughing (or clearly increases with breathing)    Negative: Back pain during pregnancy    Negative: Passed out (i.e., fainted, collapsed and was not responding)    Negative: Shock suspected (e.g., cold/pale/clammy skin, too weak to stand, low BP, rapid pulse)    Negative: Sounds like a life-threatening emergency to the triager    Protocols used: BACK PAIN-A-OH

## 2024-04-16 NOTE — NURSING NOTE
Comprehensive Intake Entered On:  7/9/2020 8:02 AM CDT    Performed On:  7/9/2020 7:57 AM CDT by Stephanie Phillips               Summary   Chief Complaint :   c/o fever, body aches, and swollen lymph nodes x5 days. Reports having a NEG covid test 4 days ago. Verbal consent given for VIDEO visit- [phone]   Menstrual Status :   Menarcheal   Height/Length Estimated :   65.75 in(Converted to: 5 ft 6 in, 167.00 cm)    Languages :   English   Stephanie Phillips - 7/9/2020 7:57 AM CDT   Health Status   Allergies Verified? :   Yes   Medication History Verified? :   Yes   Medical History Verified? :   Yes   Pre-Visit Planning Status :   Completed   Tobacco Use? :   Never smoker   Stephanie Phillips - 7/9/2020 7:57 AM CDT   Consents   Consent for Immunization Exchange :   Consent Granted   Consent for Immunizations to Providers :   Consent Granted   Stephanie Phillips - 7/9/2020 7:57 AM CDT   Meds / Allergies   (As Of: 7/9/2020 8:02:45 AM CDT)   Allergies (Active)   No Known Medication Allergies  Estimated Onset Date:   Unspecified ; Created By:   Tamra Booker CMA; Reaction Status:   Active ; Category:   Drug ; Substance:   No Known Medication Allergies ; Type:   Allergy ; Updated By:   Tamra Booker CMA; Reviewed Date:   7/9/2020 8:01 AM CDT        Medication List   (As Of: 7/9/2020 8:02:45 AM CDT)   Prescription/Discharge Order    FLUoxetine  :   FLUoxetine ; Status:   Processing ; Ordered As Mnemonic:   PROzac 20 mg oral capsule ; Ordering Provider:   Geovanna Green MD; Action Display:   Complete ; Catalog Code:   FLUoxetine ; Order Dt/Tm:   7/9/2020 8:01:11 AM CDT          ethinyl estradiol-etonogestrel  :   ethinyl estradiol-etonogestrel ; Status:   Processing ; Ordered As Mnemonic:   NuvaRing 0.120 mg-0.015 mg/24 hours vaginal ring ; Ordering Provider:   Jeanette Conroy; Action Display:   Complete ; Catalog Code:   ethinyl estradiol-etonogestrel ; Order Dt/Tm:   7/9/2020 8:01:11 AM CDT            ID Risk Screen  Report called to Rebecca COLES at short stay.  Pt will be transferred  to short stay room 15 via wc accompanied by aide.    Belongings with pt.       Recent Travel History :   No recent travel   Family Member Travel History :   No recent travel   Other Exposure to Infectious Disease :   Unknown   Stephanie Phillips - 7/9/2020 7:57 AM CDT

## 2024-12-02 NOTE — ED NOTES
Last office visit 9/4/24    PT SHOWN INPATIENT BEHAVIORAL HEALTH EDUCATION VIDEO. WAITING FOR ADMIT.